# Patient Record
Sex: MALE | Race: WHITE | NOT HISPANIC OR LATINO | Employment: FULL TIME | URBAN - METROPOLITAN AREA
[De-identification: names, ages, dates, MRNs, and addresses within clinical notes are randomized per-mention and may not be internally consistent; named-entity substitution may affect disease eponyms.]

---

## 2019-11-25 ENCOUNTER — APPOINTMENT (OUTPATIENT)
Dept: RADIOLOGY | Facility: MEDICAL CENTER | Age: 49
End: 2019-11-25
Attending: UROLOGY
Payer: COMMERCIAL

## 2019-11-25 ENCOUNTER — ANESTHESIA EVENT (OUTPATIENT)
Dept: SURGERY | Facility: MEDICAL CENTER | Age: 49
End: 2019-11-25
Payer: COMMERCIAL

## 2019-11-25 ENCOUNTER — APPOINTMENT (OUTPATIENT)
Dept: RADIOLOGY | Facility: MEDICAL CENTER | Age: 49
End: 2019-11-25
Attending: EMERGENCY MEDICINE
Payer: COMMERCIAL

## 2019-11-25 ENCOUNTER — HOSPITAL ENCOUNTER (OUTPATIENT)
Facility: MEDICAL CENTER | Age: 49
End: 2019-11-25
Attending: EMERGENCY MEDICINE | Admitting: UROLOGY
Payer: COMMERCIAL

## 2019-11-25 ENCOUNTER — ANESTHESIA (OUTPATIENT)
Dept: SURGERY | Facility: MEDICAL CENTER | Age: 49
End: 2019-11-25
Payer: COMMERCIAL

## 2019-11-25 VITALS
OXYGEN SATURATION: 98 % | WEIGHT: 315 LBS | TEMPERATURE: 98 F | RESPIRATION RATE: 18 BRPM | HEIGHT: 73 IN | DIASTOLIC BLOOD PRESSURE: 79 MMHG | SYSTOLIC BLOOD PRESSURE: 135 MMHG | BODY MASS INDEX: 41.75 KG/M2 | HEART RATE: 76 BPM

## 2019-11-25 DIAGNOSIS — R10.31 RIGHT LOWER QUADRANT ABDOMINAL PAIN: ICD-10-CM

## 2019-11-25 DIAGNOSIS — N20.1 URETEROLITHIASIS: ICD-10-CM

## 2019-11-25 DIAGNOSIS — N50.819 TESTICLE PAIN: ICD-10-CM

## 2019-11-25 LAB
ANION GAP SERPL CALC-SCNC: 15 MMOL/L (ref 0–11.9)
APPEARANCE UR: CLEAR
APTT PPP: 30.3 SEC (ref 24.7–36)
BASOPHILS # BLD AUTO: 0.7 % (ref 0–1.8)
BASOPHILS # BLD: 0.06 K/UL (ref 0–0.12)
BILIRUB UR QL STRIP.AUTO: NEGATIVE
BUN SERPL-MCNC: 15 MG/DL (ref 8–22)
CALCIUM SERPL-MCNC: 9.6 MG/DL (ref 8.4–10.2)
CHLORIDE SERPL-SCNC: 104 MMOL/L (ref 96–112)
CO2 SERPL-SCNC: 20 MMOL/L (ref 20–33)
COLOR UR: YELLOW
CREAT SERPL-MCNC: 0.8 MG/DL (ref 0.5–1.4)
EOSINOPHIL # BLD AUTO: 0.09 K/UL (ref 0–0.51)
EOSINOPHIL NFR BLD: 1.1 % (ref 0–6.9)
ERYTHROCYTE [DISTWIDTH] IN BLOOD BY AUTOMATED COUNT: 42.5 FL (ref 35.9–50)
GLUCOSE SERPL-MCNC: 116 MG/DL (ref 65–99)
GLUCOSE UR STRIP.AUTO-MCNC: NEGATIVE MG/DL
HCT VFR BLD AUTO: 48.2 % (ref 42–52)
HGB BLD-MCNC: 16.4 G/DL (ref 14–18)
IMM GRANULOCYTES # BLD AUTO: 0.03 K/UL (ref 0–0.11)
IMM GRANULOCYTES NFR BLD AUTO: 0.4 % (ref 0–0.9)
INR PPP: 0.93 (ref 0.87–1.13)
KETONES UR STRIP.AUTO-MCNC: ABNORMAL MG/DL
LEUKOCYTE ESTERASE UR QL STRIP.AUTO: NEGATIVE
LYMPHOCYTES # BLD AUTO: 1.57 K/UL (ref 1–4.8)
LYMPHOCYTES NFR BLD: 18.4 % (ref 22–41)
MCH RBC QN AUTO: 30.4 PG (ref 27–33)
MCHC RBC AUTO-ENTMCNC: 34 G/DL (ref 33.7–35.3)
MCV RBC AUTO: 89.4 FL (ref 81.4–97.8)
MICRO URNS: ABNORMAL
MONOCYTES # BLD AUTO: 0.55 K/UL (ref 0–0.85)
MONOCYTES NFR BLD AUTO: 6.4 % (ref 0–13.4)
MUCOUS THREADS #/AREA URNS HPF: ABNORMAL /HPF
NEUTROPHILS # BLD AUTO: 6.24 K/UL (ref 1.82–7.42)
NEUTROPHILS NFR BLD: 73 % (ref 44–72)
NITRITE UR QL STRIP.AUTO: NEGATIVE
NRBC # BLD AUTO: 0 K/UL
NRBC BLD-RTO: 0 /100 WBC
PH UR STRIP.AUTO: 5.5 [PH] (ref 5–8)
PLATELET # BLD AUTO: 303 K/UL (ref 164–446)
PMV BLD AUTO: 10.1 FL (ref 9–12.9)
POTASSIUM SERPL-SCNC: 4.5 MMOL/L (ref 3.6–5.5)
PROT UR QL STRIP: 30 MG/DL
PROTHROMBIN TIME: 12.5 SEC (ref 12–14.6)
RBC # BLD AUTO: 5.39 M/UL (ref 4.7–6.1)
RBC # URNS HPF: >150 /HPF
RBC UR QL AUTO: ABNORMAL
SODIUM SERPL-SCNC: 139 MMOL/L (ref 135–145)
SP GR UR REFRACTOMETRY: 1.03
TRANS CELLS URNS QL MICRO: ABNORMAL /HPF
UNIDENT CRYS URNS QL MICRO: ABNORMAL /HPF
WBC # BLD AUTO: 8.5 K/UL (ref 4.8–10.8)
WBC #/AREA URNS HPF: ABNORMAL /HPF

## 2019-11-25 PROCEDURE — 160028 HCHG SURGERY MINUTES - 1ST 30 MINS LEVEL 3: Performed by: UROLOGY

## 2019-11-25 PROCEDURE — 700105 HCHG RX REV CODE 258: Performed by: UROLOGY

## 2019-11-25 PROCEDURE — 700102 HCHG RX REV CODE 250 W/ 637 OVERRIDE(OP): Performed by: ANESTHESIOLOGY

## 2019-11-25 PROCEDURE — 80048 BASIC METABOLIC PNL TOTAL CA: CPT

## 2019-11-25 PROCEDURE — 160039 HCHG SURGERY MINUTES - EA ADDL 1 MIN LEVEL 3: Performed by: UROLOGY

## 2019-11-25 PROCEDURE — 36415 COLL VENOUS BLD VENIPUNCTURE: CPT

## 2019-11-25 PROCEDURE — C2617 STENT, NON-COR, TEM W/O DEL: HCPCS | Performed by: UROLOGY

## 2019-11-25 PROCEDURE — C1758 CATHETER, URETERAL: HCPCS | Performed by: UROLOGY

## 2019-11-25 PROCEDURE — 160035 HCHG PACU - 1ST 60 MINS PHASE I: Performed by: UROLOGY

## 2019-11-25 PROCEDURE — G0378 HOSPITAL OBSERVATION PER HR: HCPCS

## 2019-11-25 PROCEDURE — 85730 THROMBOPLASTIN TIME PARTIAL: CPT

## 2019-11-25 PROCEDURE — 96374 THER/PROPH/DIAG INJ IV PUSH: CPT

## 2019-11-25 PROCEDURE — 85610 PROTHROMBIN TIME: CPT

## 2019-11-25 PROCEDURE — 700111 HCHG RX REV CODE 636 W/ 250 OVERRIDE (IP): Performed by: ANESTHESIOLOGY

## 2019-11-25 PROCEDURE — 99285 EMERGENCY DEPT VISIT HI MDM: CPT

## 2019-11-25 PROCEDURE — 81001 URINALYSIS AUTO W/SCOPE: CPT

## 2019-11-25 PROCEDURE — 700102 HCHG RX REV CODE 250 W/ 637 OVERRIDE(OP)

## 2019-11-25 PROCEDURE — 74176 CT ABD & PELVIS W/O CONTRAST: CPT

## 2019-11-25 PROCEDURE — 700101 HCHG RX REV CODE 250: Performed by: ANESTHESIOLOGY

## 2019-11-25 PROCEDURE — 160002 HCHG RECOVERY MINUTES (STAT): Performed by: UROLOGY

## 2019-11-25 PROCEDURE — A9270 NON-COVERED ITEM OR SERVICE: HCPCS

## 2019-11-25 PROCEDURE — A9270 NON-COVERED ITEM OR SERVICE: HCPCS | Performed by: ANESTHESIOLOGY

## 2019-11-25 PROCEDURE — 700111 HCHG RX REV CODE 636 W/ 250 OVERRIDE (IP): Performed by: EMERGENCY MEDICINE

## 2019-11-25 PROCEDURE — 160009 HCHG ANES TIME/MIN: Performed by: UROLOGY

## 2019-11-25 PROCEDURE — 85025 COMPLETE CBC W/AUTO DIFF WBC: CPT

## 2019-11-25 PROCEDURE — 700105 HCHG RX REV CODE 258: Performed by: EMERGENCY MEDICINE

## 2019-11-25 PROCEDURE — 500880 HCHG PACK, CYSTO W/SEP LEGGINGS: Performed by: UROLOGY

## 2019-11-25 PROCEDURE — 96375 TX/PRO/DX INJ NEW DRUG ADDON: CPT

## 2019-11-25 PROCEDURE — 501329 HCHG SET, CYSTO IRRIG Y TUR: Performed by: UROLOGY

## 2019-11-25 PROCEDURE — 160048 HCHG OR STATISTICAL LEVEL 1-5: Performed by: UROLOGY

## 2019-11-25 DEVICE — STENT UROLOGICAL POLARIS 6X28  ULTRA: Type: IMPLANTABLE DEVICE | Status: FUNCTIONAL

## 2019-11-25 RX ORDER — HYDROMORPHONE HYDROCHLORIDE 1 MG/ML
1 INJECTION, SOLUTION INTRAMUSCULAR; INTRAVENOUS; SUBCUTANEOUS ONCE
Status: COMPLETED | OUTPATIENT
Start: 2019-11-25 | End: 2019-11-25

## 2019-11-25 RX ORDER — ONDANSETRON 2 MG/ML
4 INJECTION INTRAMUSCULAR; INTRAVENOUS
Status: DISCONTINUED | OUTPATIENT
Start: 2019-11-25 | End: 2019-11-26 | Stop reason: HOSPADM

## 2019-11-25 RX ORDER — SODIUM CHLORIDE, SODIUM LACTATE, POTASSIUM CHLORIDE, CALCIUM CHLORIDE 600; 310; 30; 20 MG/100ML; MG/100ML; MG/100ML; MG/100ML
500 INJECTION, SOLUTION INTRAVENOUS ONCE
Status: DISCONTINUED | OUTPATIENT
Start: 2019-11-25 | End: 2019-11-26 | Stop reason: HOSPADM

## 2019-11-25 RX ORDER — HALOPERIDOL 5 MG/ML
1 INJECTION INTRAMUSCULAR
Status: DISCONTINUED | OUTPATIENT
Start: 2019-11-25 | End: 2019-11-26 | Stop reason: HOSPADM

## 2019-11-25 RX ORDER — ROCURONIUM BROMIDE 10 MG/ML
INJECTION, SOLUTION INTRAVENOUS PRN
Status: DISCONTINUED | OUTPATIENT
Start: 2019-11-25 | End: 2019-11-25 | Stop reason: SURG

## 2019-11-25 RX ORDER — OXYCODONE HCL 5 MG/5 ML
10 SOLUTION, ORAL ORAL
Status: COMPLETED | OUTPATIENT
Start: 2019-11-25 | End: 2019-11-25

## 2019-11-25 RX ORDER — ONDANSETRON 2 MG/ML
INJECTION INTRAMUSCULAR; INTRAVENOUS PRN
Status: DISCONTINUED | OUTPATIENT
Start: 2019-11-25 | End: 2019-11-25 | Stop reason: SURG

## 2019-11-25 RX ORDER — LABETALOL HYDROCHLORIDE 5 MG/ML
5 INJECTION, SOLUTION INTRAVENOUS
Status: DISCONTINUED | OUTPATIENT
Start: 2019-11-25 | End: 2019-11-26 | Stop reason: HOSPADM

## 2019-11-25 RX ORDER — SUCCINYLCHOLINE CHLORIDE 20 MG/ML
INJECTION INTRAMUSCULAR; INTRAVENOUS PRN
Status: DISCONTINUED | OUTPATIENT
Start: 2019-11-25 | End: 2019-11-25 | Stop reason: SURG

## 2019-11-25 RX ORDER — SODIUM CHLORIDE 9 MG/ML
1000 INJECTION, SOLUTION INTRAVENOUS ONCE
Status: COMPLETED | OUTPATIENT
Start: 2019-11-25 | End: 2019-11-25

## 2019-11-25 RX ORDER — PHENAZOPYRIDINE HYDROCHLORIDE 200 MG/1
200 TABLET, FILM COATED ORAL
Status: DISCONTINUED | OUTPATIENT
Start: 2019-11-25 | End: 2019-11-26 | Stop reason: HOSPADM

## 2019-11-25 RX ORDER — CEFAZOLIN SODIUM 1 G/3ML
INJECTION, POWDER, FOR SOLUTION INTRAMUSCULAR; INTRAVENOUS PRN
Status: DISCONTINUED | OUTPATIENT
Start: 2019-11-25 | End: 2019-11-25 | Stop reason: SURG

## 2019-11-25 RX ORDER — DIPHENHYDRAMINE HYDROCHLORIDE 50 MG/ML
12.5 INJECTION INTRAMUSCULAR; INTRAVENOUS
Status: DISCONTINUED | OUTPATIENT
Start: 2019-11-25 | End: 2019-11-26 | Stop reason: HOSPADM

## 2019-11-25 RX ORDER — SODIUM CHLORIDE, SODIUM LACTATE, POTASSIUM CHLORIDE, CALCIUM CHLORIDE 600; 310; 30; 20 MG/100ML; MG/100ML; MG/100ML; MG/100ML
1000 INJECTION, SOLUTION INTRAVENOUS
Status: DISCONTINUED | OUTPATIENT
Start: 2019-11-25 | End: 2019-11-25 | Stop reason: HOSPADM

## 2019-11-25 RX ORDER — PHENAZOPYRIDINE HYDROCHLORIDE 200 MG/1
200 TABLET, FILM COATED ORAL ONCE
Status: CANCELLED | OUTPATIENT
Start: 2019-11-25 | End: 2019-11-26

## 2019-11-25 RX ORDER — MEPERIDINE HYDROCHLORIDE 25 MG/ML
INJECTION INTRAMUSCULAR; INTRAVENOUS; SUBCUTANEOUS PRN
Status: DISCONTINUED | OUTPATIENT
Start: 2019-11-25 | End: 2019-11-25 | Stop reason: SURG

## 2019-11-25 RX ORDER — KETOROLAC TROMETHAMINE 30 MG/ML
30 INJECTION, SOLUTION INTRAMUSCULAR; INTRAVENOUS ONCE
Status: COMPLETED | OUTPATIENT
Start: 2019-11-25 | End: 2019-11-25

## 2019-11-25 RX ORDER — HYDROMORPHONE HYDROCHLORIDE 1 MG/ML
1 INJECTION, SOLUTION INTRAMUSCULAR; INTRAVENOUS; SUBCUTANEOUS
Status: DISCONTINUED | OUTPATIENT
Start: 2019-11-25 | End: 2019-11-26 | Stop reason: HOSPADM

## 2019-11-25 RX ORDER — SODIUM CHLORIDE 9 MG/ML
INJECTION, SOLUTION INTRAVENOUS CONTINUOUS
Status: DISCONTINUED | OUTPATIENT
Start: 2019-11-25 | End: 2019-11-26 | Stop reason: HOSPADM

## 2019-11-25 RX ORDER — KETOROLAC TROMETHAMINE 30 MG/ML
INJECTION, SOLUTION INTRAMUSCULAR; INTRAVENOUS PRN
Status: DISCONTINUED | OUTPATIENT
Start: 2019-11-25 | End: 2019-11-25 | Stop reason: SURG

## 2019-11-25 RX ORDER — LIDOCAINE HYDROCHLORIDE 20 MG/ML
INJECTION, SOLUTION EPIDURAL; INFILTRATION; INTRACAUDAL; PERINEURAL PRN
Status: DISCONTINUED | OUTPATIENT
Start: 2019-11-25 | End: 2019-11-25 | Stop reason: SURG

## 2019-11-25 RX ORDER — MIDAZOLAM HYDROCHLORIDE 1 MG/ML
2 INJECTION INTRAMUSCULAR; INTRAVENOUS
Status: DISCONTINUED | OUTPATIENT
Start: 2019-11-25 | End: 2019-11-26 | Stop reason: HOSPADM

## 2019-11-25 RX ORDER — PHENAZOPYRIDINE HYDROCHLORIDE 200 MG/1
TABLET, FILM COATED ORAL
Status: COMPLETED
Start: 2019-11-25 | End: 2019-11-25

## 2019-11-25 RX ORDER — OXYCODONE HCL 5 MG/5 ML
5 SOLUTION, ORAL ORAL
Status: COMPLETED | OUTPATIENT
Start: 2019-11-25 | End: 2019-11-25

## 2019-11-25 RX ORDER — MEPERIDINE HYDROCHLORIDE 25 MG/ML
25 INJECTION INTRAMUSCULAR; INTRAVENOUS; SUBCUTANEOUS
Status: DISCONTINUED | OUTPATIENT
Start: 2019-11-25 | End: 2019-11-26 | Stop reason: HOSPADM

## 2019-11-25 RX ADMIN — KETOROLAC TROMETHAMINE 30 MG: 30 INJECTION, SOLUTION INTRAMUSCULAR at 19:58

## 2019-11-25 RX ADMIN — SUCCINYLCHOLINE CHLORIDE 200 MG: 20 INJECTION, SOLUTION INTRAMUSCULAR; INTRAVENOUS at 19:40

## 2019-11-25 RX ADMIN — SODIUM CHLORIDE 1000 ML: 9 INJECTION, SOLUTION INTRAVENOUS at 08:36

## 2019-11-25 RX ADMIN — SODIUM CHLORIDE: 9 INJECTION, SOLUTION INTRAVENOUS at 13:34

## 2019-11-25 RX ADMIN — PROPOFOL 100 MG: 10 INJECTION, EMULSION INTRAVENOUS at 19:40

## 2019-11-25 RX ADMIN — HYDROMORPHONE HYDROCHLORIDE 1 MG: 1 INJECTION, SOLUTION INTRAMUSCULAR; INTRAVENOUS; SUBCUTANEOUS at 08:36

## 2019-11-25 RX ADMIN — PHENAZOPYRIDINE 200 MG: 200 TABLET ORAL at 20:36

## 2019-11-25 RX ADMIN — KETOROLAC TROMETHAMINE 30 MG: 30 INJECTION, SOLUTION INTRAMUSCULAR at 08:37

## 2019-11-25 RX ADMIN — CEFAZOLIN 3 G: 1 INJECTION, POWDER, FOR SOLUTION INTRAVENOUS at 19:35

## 2019-11-25 RX ADMIN — PROPOFOL 30 MG: 10 INJECTION, EMULSION INTRAVENOUS at 19:38

## 2019-11-25 RX ADMIN — ONDANSETRON 4 MG: 2 INJECTION INTRAMUSCULAR; INTRAVENOUS at 20:13

## 2019-11-25 RX ADMIN — ALFENTANIL HYDROCHLORIDE 1000 MCG: 500 INJECTION, SOLUTION INTRAVENOUS at 19:38

## 2019-11-25 RX ADMIN — SODIUM CHLORIDE, POTASSIUM CHLORIDE, SODIUM LACTATE AND CALCIUM CHLORIDE: 600; 310; 30; 20 INJECTION, SOLUTION INTRAVENOUS at 19:35

## 2019-11-25 RX ADMIN — LIDOCAINE HYDROCHLORIDE 40 MG: 20 INJECTION, SOLUTION EPIDURAL; INFILTRATION; INTRACAUDAL; PERINEURAL at 19:38

## 2019-11-25 RX ADMIN — SUGAMMADEX 200 MG: 100 INJECTION, SOLUTION INTRAVENOUS at 20:17

## 2019-11-25 RX ADMIN — EPHEDRINE SULFATE 15 MG: 50 INJECTION INTRAMUSCULAR; INTRAVENOUS; SUBCUTANEOUS at 19:55

## 2019-11-25 RX ADMIN — OXYCODONE HYDROCHLORIDE 5 MG: 5 SOLUTION ORAL at 23:22

## 2019-11-25 RX ADMIN — ROCURONIUM BROMIDE 30 MG: 10 INJECTION, SOLUTION INTRAVENOUS at 19:43

## 2019-11-25 RX ADMIN — MEPERIDINE HYDROCHLORIDE 25 MG: 25 INJECTION INTRAMUSCULAR; INTRAVENOUS; SUBCUTANEOUS at 19:48

## 2019-11-25 RX ADMIN — ROCURONIUM BROMIDE 10 MG: 10 INJECTION, SOLUTION INTRAVENOUS at 19:52

## 2019-11-25 RX ADMIN — ROCURONIUM BROMIDE 5 MG: 10 INJECTION, SOLUTION INTRAVENOUS at 19:38

## 2019-11-25 RX ADMIN — MEPERIDINE HYDROCHLORIDE 25 MG: 25 INJECTION INTRAMUSCULAR; INTRAVENOUS; SUBCUTANEOUS at 20:12

## 2019-11-25 ASSESSMENT — PATIENT HEALTH QUESTIONNAIRE - PHQ9
2. FEELING DOWN, DEPRESSED, IRRITABLE, OR HOPELESS: NOT AT ALL
1. LITTLE INTEREST OR PLEASURE IN DOING THINGS: NOT AT ALL
SUM OF ALL RESPONSES TO PHQ9 QUESTIONS 1 AND 2: 0

## 2019-11-25 ASSESSMENT — LIFESTYLE VARIABLES
ALCOHOL_USE: YES
TOTAL SCORE: 0
TOTAL SCORE: 0
AVERAGE NUMBER OF DAYS PER WEEK YOU HAVE A DRINK CONTAINING ALCOHOL: 2
CONSUMPTION TOTAL: NEGATIVE
EVER_SMOKED: NEVER
HAVE YOU EVER FELT YOU SHOULD CUT DOWN ON YOUR DRINKING: NO
ON A TYPICAL DAY WHEN YOU DRINK ALCOHOL HOW MANY DRINKS DO YOU HAVE: 2
HOW MANY TIMES IN THE PAST YEAR HAVE YOU HAD 5 OR MORE DRINKS IN A DAY: 0
EVER HAD A DRINK FIRST THING IN THE MORNING TO STEADY YOUR NERVES TO GET RID OF A HANGOVER: NO
TOTAL SCORE: 0
HAVE PEOPLE ANNOYED YOU BY CRITICIZING YOUR DRINKING: NO
DOES PATIENT WANT TO STOP DRINKING: NO
EVER FELT BAD OR GUILTY ABOUT YOUR DRINKING: NO

## 2019-11-25 ASSESSMENT — COGNITIVE AND FUNCTIONAL STATUS - GENERAL
MOBILITY SCORE: 24
DAILY ACTIVITIY SCORE: 24
SUGGESTED CMS G CODE MODIFIER MOBILITY: CH
SUGGESTED CMS G CODE MODIFIER DAILY ACTIVITY: CH

## 2019-11-25 ASSESSMENT — COPD QUESTIONNAIRES
COPD SCREENING SCORE: 0
DO YOU EVER COUGH UP ANY MUCUS OR PHLEGM?: NO/ONLY WITH OCCASIONAL COLDS OR INFECTIONS
DURING THE PAST 4 WEEKS HOW MUCH DID YOU FEEL SHORT OF BREATH: NONE/LITTLE OF THE TIME
IN THE PAST 12 MONTHS DO YOU DO LESS THAN YOU USED TO BECAUSE OF YOUR BREATHING PROBLEMS: DISAGREE/UNSURE
HAVE YOU SMOKED AT LEAST 100 CIGARETTES IN YOUR ENTIRE LIFE: NO/DON'T KNOW

## 2019-11-25 ASSESSMENT — PAIN SCALES - GENERAL: PAIN_LEVEL: 1

## 2019-11-25 NOTE — ED PROVIDER NOTES
"ED Provider Note    CHIEF COMPLAINT  Chief Complaint   Patient presents with   • LLQ Pain     \"feels like I've been kicked in the left testicle.\" started 0700 yesterday AM   • Groin Pain       HPI  Jae Brandon is a 49 y.o. male who presents to the emergency department the chief complaint of abdominal pain and testicular pain.  Patient describes acute onset of left lower quadrant abdominal pain radiating to the left testicle.  He says his left testicle hurts like somebody has kicked him in the groin.  He describes a sharp stabbing pain.  Relatively severe.  No alleviating or exacerbating factors.  No fevers, no dysuria.  No hematuria.  No vomiting.    REVIEW OF SYSTEMS  See HPI for further details. All other systems are negative.     PAST MEDICAL HISTORY  History reviewed. No pertinent past medical history.    FAMILY HISTORY  History reviewed. No pertinent family history.    SOCIAL HISTORY  Social History     Socioeconomic History   • Marital status:      Spouse name: Not on file   • Number of children: Not on file   • Years of education: Not on file   • Highest education level: Not on file   Occupational History   • Not on file   Social Needs   • Financial resource strain: Not on file   • Food insecurity:     Worry: Not on file     Inability: Not on file   • Transportation needs:     Medical: Not on file     Non-medical: Not on file   Tobacco Use   • Smoking status: Never Smoker   • Smokeless tobacco: Never Used   Substance and Sexual Activity   • Alcohol use: Not Currently   • Drug use: Not Currently   • Sexual activity: Not on file   Lifestyle   • Physical activity:     Days per week: Not on file     Minutes per session: Not on file   • Stress: Not on file   Relationships   • Social connections:     Talks on phone: Not on file     Gets together: Not on file     Attends Zoroastrianism service: Not on file     Active member of club or organization: Not on file     Attends meetings of clubs or " "organizations: Not on file     Relationship status: Not on file   • Intimate partner violence:     Fear of current or ex partner: Not on file     Emotionally abused: Not on file     Physically abused: Not on file     Forced sexual activity: Not on file   Other Topics Concern   • Not on file   Social History Narrative   • Not on file       SURGICAL HISTORY  History reviewed. No pertinent surgical history.    CURRENT MEDICATIONS  Home Medications    **Home medications have not yet been reviewed for this encounter**         ALLERGIES  No Known Allergies    PHYSICAL EXAM  VITAL SIGNS: /86   Pulse (!) 58   Temp 36.1 °C (97 °F) (Temporal)   Resp 16   Ht 1.854 m (6' 1\")   Wt (!) 143 kg (315 lb 4.1 oz)   SpO2 95%   BMI 41.59 kg/m²   Constitutional: Well developed, Well nourished, mild distress, Non-toxic appearance.  Uncomfortable but stoic male.  HENT: Normocephalic, Atraumatic, Bilateral external ears normal, Oropharynx moist, No oral exudates, Nose normal.   Eyes: PERRL, EOMI, Conjunctiva normal, No discharge.   Neck: Normal range of motion, No tenderness, Supple, No stridor.   Lymphatic: No lymphadenopathy noted.   Cardiovascular: Normal heart rate, Normal rhythm, No murmurs, No rubs, No gallops.   Thorax & Lungs: Normal breath sounds, No respiratory distress, No wheezing, No chest tenderness.   Abdomen: Mild tenderness in the very low left lower quadrant, obese, no rebound tenderness or guarding.  No peritoneal signs.  Skin: Warm, Dry, No erythema, No rash.   Back: No tenderness, No CVA tenderness.   Extremities: Intact distal pulses, No edema, No tenderness, No cyanosis, No clubbing.   Neurologic: Alert & oriented x 3, Normal motor function, Normal sensory function, No focal deficits noted.       RADIOLOGY/PROCEDURES  CT-RENAL COLIC EVALUATION(A/P W/O)   Final Result      10 mm left UPJ calculus causes mild hydroureteronephrosis      Severe colonic diverticulosis without evidence of diverticulitis    "     Results for orders placed or performed during the hospital encounter of 11/25/19   URINALYSIS,CULTURE IF INDICATED   Result Value Ref Range    Color Yellow     Character Clear     Ph 5.5 5.0 - 8.0    Glucose Negative Negative mg/dL    Ketones Trace (A) Negative mg/dL    Protein 30 (A) Negative mg/dL    Bilirubin Negative Negative    Nitrite Negative Negative    Leukocyte Esterase Negative Negative    Occult Blood Large (A) Negative    Micro Urine Req Microscopic    REFRACTOMETER SG   Result Value Ref Range    Specific Gravity 1.028    URINE MICROSCOPIC (W/UA)   Result Value Ref Range    WBC 2-5 (A) /hpf    RBC >150 (A) /hpf    Trans Epithelial Cells Moderate (A) /hpf    Mucous Threads Many /hpf    Urine Crystals Mod Ca Oxalate /hpf   CBC WITH DIFFERENTIAL   Result Value Ref Range    WBC 8.5 4.8 - 10.8 K/uL    RBC 5.39 4.70 - 6.10 M/uL    Hemoglobin 16.4 14.0 - 18.0 g/dL    Hematocrit 48.2 42.0 - 52.0 %    MCV 89.4 81.4 - 97.8 fL    MCH 30.4 27.0 - 33.0 pg    MCHC 34.0 33.7 - 35.3 g/dL    RDW 42.5 35.9 - 50.0 fL    Platelet Count 303 164 - 446 K/uL    MPV 10.1 9.0 - 12.9 fL    Neutrophils-Polys 73.00 (H) 44.00 - 72.00 %    Lymphocytes 18.40 (L) 22.00 - 41.00 %    Monocytes 6.40 0.00 - 13.40 %    Eosinophils 1.10 0.00 - 6.90 %    Basophils 0.70 0.00 - 1.80 %    Immature Granulocytes 0.40 0.00 - 0.90 %    Nucleated RBC 0.00 /100 WBC    Neutrophils (Absolute) 6.24 1.82 - 7.42 K/uL    Lymphs (Absolute) 1.57 1.00 - 4.80 K/uL    Monos (Absolute) 0.55 0.00 - 0.85 K/uL    Eos (Absolute) 0.09 0.00 - 0.51 K/uL    Baso (Absolute) 0.06 0.00 - 0.12 K/uL    Immature Granulocytes (abs) 0.03 0.00 - 0.11 K/uL    NRBC (Absolute) 0.00 K/uL   BASIC METABOLIC PANEL   Result Value Ref Range    Sodium 139 135 - 145 mmol/L    Potassium 4.5 3.6 - 5.5 mmol/L    Chloride 104 96 - 112 mmol/L    Co2 20 20 - 33 mmol/L    Glucose 116 (H) 65 - 99 mg/dL    Bun 15 8 - 22 mg/dL    Creatinine 0.80 0.50 - 1.40 mg/dL    Calcium 9.6 8.4 - 10.2 mg/dL     Anion Gap 15.0 (H) 0.0 - 11.9   PROTHROMBIN TIME (INR)   Result Value Ref Range    PT 12.5 12.0 - 14.6 sec    INR 0.93 0.87 - 1.13   APTT   Result Value Ref Range    APTT 30.3 24.7 - 36.0 sec   ESTIMATED GFR   Result Value Ref Range    GFR If African American >60 >60 mL/min/1.73 m 2    GFR If Non African American >60 >60 mL/min/1.73 m 2         COURSE & MEDICAL DECISION MAKING  Pertinent Labs & Imaging studies reviewed. (See chart for details)    The patient presents today with left lower quadrant abdominal pain and testicular pain.  I am suspicious for ureterolithiasis as etiology the patient's pain.  Will obtain urinalysis and CT scan.    Urinalysis is remarkable for hematuria.  Continue to await CT scan.  Patient's pain is been well controlled with hydromorphone and Toradol.    CT scan is obtained.  The patient has a 10 mm proximal UPJ stone on the left.  Obstructive changes noted.  Spoke with the on-call urologist Dr. Whitehead who plans to take the patient to the OR.    The urologist asked me to write holding orders.  This is been completed as discussed.  Patient will be admitted for operative intervention for his large ureteral stone.      FINAL IMPRESSION  1. Right lower quadrant abdominal pain    2. Testicle pain    3. Ureterolithiasis            Electronically signed by: Rashi Summers, 11/25/2019 1:16 PM

## 2019-11-25 NOTE — PROGRESS NOTES
Pt arrived to floor from ER. Assumed care of patient. Discussed daily plan of care, oriented patient to floor. VSS. Pt currently reports tolerable pain, rated at 5/10. Denies nausea at this time. Hourly rounding in place.

## 2019-11-25 NOTE — PROGRESS NOTES
[facility] Dr. Whitehead paged per nursing communication.    1345 Call returned [facility]. No new orders received at this time.

## 2019-11-25 NOTE — ED TRIAGE NOTES
"Chief Complaint   Patient presents with   • LLQ Pain     \"feels like I've been kicked in the left testicle.\" started 0700 yesterday AM   • Groin Injury     Pt reports that he was sent from  for further eval.  /90   Pulse 88   Temp 36.1 °C (97 °F) (Temporal)   Resp 16   Ht 1.854 m (6' 1\")   Wt (!) 143 kg (315 lb 4.1 oz)   SpO2 95%   Pt informed of wait times. Educated on triage process.  Asked to return to triage RN for any new or worsening of symptoms. Thanked for patience.        "

## 2019-11-25 NOTE — ED NOTES
Updated pt on POC. In agreement with POC. Pt with no needs at this time. Call light in reach. WCTM.

## 2019-11-25 NOTE — ED NOTES
Report called to DONTE Sousa.  Aware pt is on the way up via  and that I am available for any questions/concerns on pt's arrival to floor.  No changes on departing ED.  Pt has remained NPO while in ED.

## 2019-11-26 NOTE — CARE PLAN
Problem: Communication  Goal: The ability to communicate needs accurately and effectively will improve  Outcome: PROGRESSING AS EXPECTED  Intervention: [place] patient and significant other/support system to call light to alert staff of needs  Flowsheets (Taken 11/25/2019 1612)  Oriented to:: All of the Following : Location of Bathroom, Visiting Policy, Unit Routine, Call Light and Bedside Controls, Bedside Rail Policy, Smoking Policy, Rights and Responsibilities, Bedside Report, and Patient Education Notebook  Note:   Pt oriented to unit upon arrival from ER. Pt instructed to utilize call light to call for assistance as needed. Pt verbalized understanding. Hourly rounding in place to ensure needs are met.       Problem: Safety  Goal: Will remain free from falls  Outcome: PROGRESSING AS EXPECTED  Intervention: Implement fall precautions  Flowsheets (Taken 11/25/2019 1330)  Environmental Precautions: Personal Belongings, Wastebasket, Call Bell etc. in Easy Reach;Bed in Low Position;Communication Sign for Patients & Families;Mobility Assessed & Appropriate Sign Placed  Note:   Environmental fall precautions and hourly rounding in place. Pt instructed to call for assistance as needed. Pt verbalized understanding, able to ambulate independently with steady gait, no assistive device needed.

## 2019-11-26 NOTE — ANESTHESIA POSTPROCEDURE EVALUATION
Patient: Jae Brandon    Procedure Summary     Date:  11/25/19 Room / Location:   OR  / SURGERY [facility]    Anesthesia Start:  1935 Anesthesia Stop:  2027    Procedures:       CYSTOSCOPY (Left )      URETEROSCOPY      LITHOTRIPSY, USING LASER      STENT PLACEMENT Diagnosis:      Surgeon:  Donovan Whitehead M.D. Responsible Provider:  Mitchel Goodwin M.D.    Anesthesia Type:  general ASA Status:  3 - Emergent          Final Anesthesia Type: general  Last vitals  BP   Blood Pressure: 137/79    Temp   36.9 °C (98.5 °F)    Pulse   Pulse: 65   Resp   18    SpO2   94 %      Anesthesia Post Evaluation    Patient location during evaluation: PACU  Patient participation: complete - patient participated  Level of consciousness: awake and alert  Pain score: 1    Airway patency: patent  Anesthetic complications: no  Cardiovascular status: hemodynamically stable  Respiratory status: acceptable  Hydration status: euvolemic    PONV: none           Nurse Pain Score: 5 (NPRS)

## 2019-11-26 NOTE — OR SURGEON
Immediate Post OP Note    PreOp Diagnosis: 11 mm left UPJ calculus; Left renal colic    PostOp Diagnosis: same; tight distal ureter    Procedure(s):  CYSTOSCOPY  Attempted dilation of left ureter  STENT PLACEMENT    Surgeon(s):  Donovan Whitehead M.D.    Anesthesiologist/Type of Anesthesia:  Anesthesiologist: Mitchel Goodwin M.D./* No anesthesia type entered *    Surgical Staff:  Circulator: Dane Treadwell R.N.  Scrub Person: Nikos Burns    Specimens removed if any:  * No specimens in log *    Estimated Blood Loss: none    Findings: tight Left ureteral orifice    Complications: none.  Stable to PACU.  Home tonight.        11/25/2019 8:18 PM Donovan Whitehead M.D.

## 2019-11-26 NOTE — DISCHARGE INSTRUCTIONS
Discharge Instructions    Discharged to home by car with relative. Discharged via walking, hospital escort: Yes.  Special equipment needed: Not Applicable    Be sure to schedule a follow-up appointment with your primary care doctor or any specialists as instructed.     Discharge Plan:   Diet Plan: Discussed  Activity Level: Discussed  Confirmed Follow up Appointment: Patient to Call and Schedule Appointment  Medication Reconciliation Updated: Yes  Influenza Vaccine Indication: Not indicated: Previously immunized this influenza season and > 8 years of age    I understand that a diet low in cholesterol, fat, and sodium is recommended for good health. Unless I have been given specific instructions below for another diet, I accept this instruction as my diet prescription.   Other diet: regular    Special Instructions: None    · Is patient discharged on Warfarin / Coumadin?   No     Depression / Suicide Risk    As you are discharged from this [facility] facility, it is important to learn how to keep safe from harming yourself.    Recognize the warning signs:  · Abrupt changes in personality, positive or negative- including increase in energy   · Giving away possessions  · Change in eating patterns- significant weight changes-  positive or negative  · Change in sleeping patterns- unable to sleep or sleeping all the time   · Unwillingness or inability to communicate  · Depression  · Unusual sadness, discouragement and loneliness  · Talk of wanting to die  · Neglect of personal appearance   · Rebelliousness- reckless behavior  · Withdrawal from people/activities they love  · Confusion- inability to concentrate     If you or a loved one observes any of these behaviors or has concerns about self-harm, here's what you can do:  · Talk about it- your feelings and reasons for harming yourself  · Remove any means that you might use to hurt yourself (examples: pills, rope, extension cords, firearm)  · Get professional help from  the community (Mental Health, Substance Abuse, psychological counseling)  · Do not be alone:Call your Safe Contact- someone whom you trust who will be there for you.  · Call your local CRISIS HOTLINE 870-4932 or 524-394-7823  · Call your local Children's Mobile Crisis Response Team [place] (904) 593-2248 or www.[employer]  · Call the toll free National Suicide Prevention Hotlines   · National Suicide Prevention Lifeline 527-161-URSM (2713)  · [place] Line Network 800-SUICIDE (214-5496)        Ureteral Stent Implantation  Introduction  Ureteral stent implantation is a procedure to insert (implant) a flexible, soft, plastic tube (stent) into a tube (ureter) that drains urine from the kidneys. The stent supports the ureter while it heals and helps to drain urine from the kidneys. You may have a ureteral stent implanted after having a procedure to remove a blockage from the ureter (ureterolysis or pyeloplasty). You may also have a stent implanted to open the flow of urine when you have a blockage caused by a kidney stone, tumor, blood clot, or infection.  You have two ureters, one on each side of the body. The ureters connect the kidneys to the organ that holds urine until it passes out of the body (bladder). The stent is placed so that one end is in the kidney, and one end is in the bladder. The stent is usually taken out after your ureter has healed. Depending on your condition, you may have a stent for just a few weeks, or you may have a long-term stent that will need to be replaced every few months.  Tell a health care provider about:  · Any allergies you have.  · All medicines you are taking, including vitamins, herbs, eye drops, creams, and over-the-counter medicines.  · Any problems you or family members have had with anesthetic medicines.  · Any blood disorders you have.  · Any surgeries you have had.  · Any medical conditions you have.  · Whether you are pregnant or may be pregnant.  What are  the risks?  Generally, this is a safe procedure. However, problems may occur, including:  · Infection.  · Bleeding.  · Allergic reactions to medicines.  · Damage to other structures or organs. Tearing (perforation) of the ureter is possible.  · Movement of the stent away from where it is placed during surgery (migration).  What happens before the procedure?  · Ask your health care provider about:  ¨ Changing or stopping your regular medicines. This is especially important if you are taking diabetes medicines or blood thinners.  ¨ Taking medicines such as aspirin and ibuprofen. These medicines can thin your blood. Do not take these medicines before your procedure if your health care provider instructs you not to.  · Follow instructions from your health care provider about eating or drinking restrictions.  · Do not drink alcohol and do not use any tobacco products before your procedure, as told by your health care provider.  · You may be given antibiotic medicine to help prevent infection.  · Plan to have someone take you home after the procedure.  · If you go home right after the procedure, plan to have someone with you for 24 hours.  What happens during the procedure?  · An IV tube will be inserted into one of your veins.  · You will be given a medicine to make you fall asleep (general anesthetic). You may also be given a medicine to help you relax (sedative).  · A thin, tube-shaped instrument with a light and tiny camera at the end (cystoscope) will be inserted into your urethra. The urethra is the tube that drains urine from the bladder out of the body. In men, the urethra opens at the end of the penis. In women, the urethra opens in front of the vaginal opening.  · The cystoscope will be passed into your bladder.  · A thin wire (guide wire) will be passed through your bladder and into your ureter. This is used to guide the stent into your ureter.  · The stent will be inserted into your ureter.  · The guide wire  and the cystoscope will be removed.  · A flexible tube (catheter) will be inserted through your urethra so that one end is in your bladder. This helps to drain urine from your bladder.  The procedure may vary among hospitals and health care providers.  What happens after the procedure?  · Your blood pressure, heart rate, breathing rate, and blood oxygen level will be monitored often until the medicines you were given have worn off.  · You may continue to receive medicine and fluids through an IV tube.  · You may have some soreness or pain in your abdomen and urethra. Medicines will be available to help you.  · You will be encouraged to get up and walk around as soon as you can.  · You will have some blood in your urine.  · Do not drive for 24 hours if you received a sedative.  This information is not intended to replace advice given to you by your health care provider. Make sure you discuss any questions you have with your health care provider.  Document Released: 12/15/2001 Document Revised: 05/25/2017 Document Reviewed: 07/01/2016  © 2017 [employer]    GENERAL POST-OPERATIVE  PATIENT INSTRUCTIONS      FOLLOW-UP:  If you have any fevers greater than 102.5, drainage from you wound that is not clear or looks infected, persistent bleeding, increasing abdominal pain, problems urinating, or persistent nausea/vomiting.      DIET:  You may eat any foods that you can tolerate.  It is a good idea to eat a high fiber diet and take in plenty of fluids to prevent constipation.  If you do become constipated you may want to take a mild laxative or take ducolax tablets on a daily basis until your bowel habits are regular.  Constipation can be very uncomfortable, along with straining, after recent surgery.    ACTIVITY:  You are encouraged to cough and deep breath or use your incentive spirometer if you were given one, every 15-30 minutes when awake.  This will help prevent respiratory complications and low grade fevers  post-operatively if you had a general anesthetic.        QUESTIONS:  Please feel free to call your physician or the hospital  if you have any questions, and they will be glad to assist you.    Cystoscopy, Care After  Refer to this sheet in the next few weeks. These instructions provide you with information on caring for yourself after your procedure. Your caregiver may also give you more specific instructions. Your treatment has been planned according to current medical practices, but problems sometimes occur. Call your caregiver if you have any problems or questions after your procedure.  HOME CARE INSTRUCTIONS   Things you can do to ease any discomfort after your procedure include:  · Drinking enough water and fluids to keep your urine clear or pale yellow.  · Taking a warm bath to relieve any burning feelings.  SEEK IMMEDIATE MEDICAL CARE IF:   · You have an increase in blood in your urine.  · You notice blood clots in your urine.  · You have difficulty passing urine.  · You have the chills.  · You have abdominal pain.  · You have a fever or persistent symptoms for more than 2-3 days.  · You have a fever and your symptoms suddenly get worse.  MAKE SURE YOU:   · Understand these instructions.  · Will watch your condition.  · Will get help right away if you are not doing well or get worse.     This information is not intended to replace advice given to you by your health care provider. Make sure you discuss any questions you have with your health care provider.     Document Released: 07/07/2006 Document Revised: 01/08/2016 Document Reviewed: 06/10/2013  [employer] Interactive Patient Education ©2016 [employer]

## 2019-11-26 NOTE — PROGRESS NOTES
Urology pre op note.    48 y/o male with 24 hours of left flank pain.  Rob ER and Ct shows 11x7 mm left renal calculus. Will take to Or for Left ureteroscopy, lasertripsy of stone and stent insertion.  Risks and benefits discussed.  Patient agrees to procedure.

## 2019-11-26 NOTE — ANESTHESIA PREPROCEDURE EVALUATION
Relevant Problems   No relevant active problems       Physical Exam    Airway   Mallampati: II  TM distance: >3 FB  Neck ROM: full       Cardiovascular - normal exam  Rhythm: regular  Rate: normal     Dental - normal exam         Pulmonary - normal exam  Breath sounds clear to auscultation     Abdominal    Neurological - normal exam                 Anesthesia Plan    ASA 3- EMERGENT   ASA physical status emergent criteria: sepsis    Plan - general       Airway plan will be ETT        Induction: intravenous and rapid sequence    Postoperative Plan: Postoperative administration of opioids is intended.    Pertinent diagnostic labs and testing reviewed    Informed Consent:    Anesthetic plan and risks discussed with patient.    Use of blood products discussed with: patient whom consented to blood products.

## 2019-11-26 NOTE — OR NURSING
2026-arrived pacu.  Awake alert oriented x3. vss spontaneous clr resp throughout.  Pt denies pain or nausea.    2030- pt medicated per md orders.  Urinary meatus dry and clean w/o d/c or redness. Tolerates po flds w/o n/v.  2045-wife updated by phone. Vss. Meets criteria for transfer to floor.  2055-report to Gi KENT.   2105-pt transferred to floor.

## 2019-11-26 NOTE — ANESTHESIA PROCEDURE NOTES
Airway  Date/Time: 11/25/2019 7:41 PM  Performed by: Mitchel Goodwin M.D.  Authorized by: Mitchel Goodwin M.D.     Location:  OR  Urgency:  Elective  Indications for Airway Management:  Anesthesia  Spontaneous Ventilation: absent    Sedation Level:  Deep  Preoxygenated: Yes    Patient Position:  Sniffing  Final Airway Type:  Endotracheal airway  Final Endotracheal Airway:  ETT  Cuffed: Yes    Technique Used for Successful ETT Placement:  Direct laryngoscopy  Devices/Methods Used in Placement:  Intubating stylet and cricoid pressure  Insertion Site:  Oral  Blade Type:  Nixon  Laryngoscope Blade/Videolaryngoscope Blade Size:  2  ETT Size (mm):  8.0  Measured from:  Lips  ETT to Lips (cm):  24  Placement Verified by: auscultation and capnometry    Cormack-Lehane Classification:  Grade I - full view of glottis  Number of Attempts at Approach:  1

## 2019-11-26 NOTE — OP REPORT
DATE OF SERVICE:  11/25/2019    PREOPERATIVE DIAGNOSIS:  10 mm left ureteropelvic junction calculus with left   renal colic.    POSTOPERATIVE DIAGNOSES:   1.  10 mm left ureteropelvic junction calculus with left renal colic.  2.  Tight left ureter.    ANESTHESIA:  General.    ANESTHESIOLOGIST:  Mitchel Goodwin MD    SURGEON:  Donovan Whitehead MD    PROCEDURE:  Cystoscopy with attempted dilation of left ureter and insertion of   left ureteral stent.    BRIEF HISTORY:  This 49-year-old male presented to the emergency room today   with about a 24-hour history of left renal colic.  CT scan showed   approximately 11 x 7 mm left ureteropelvic junction calculus.  Because of the   stone, he now presents for cystoscopy, possible ureteroscopy, laser tripsy and   stent insertion.  Risks and benefits were discussed with the patient in the   preoperative area.    REPORT OF OPERATION:  Under general anesthesia with the patient in the   lithotomy position, the genitalia were prepped and draped in the usual manner.    The 22-Belgian cystoscope was introduced into the bladder.  The urethra and   prostatic urethra were normal.  Bladder was normal.  Next, under fluoroscopic   guidance, I passed a ZIPwire up to the left kidney without difficulty.  Next,   the bladder was drained and the cystoscope removed.  I attempted passing the   inner sheath of a 12x14 Belgian diameter access sheath.  The inner sheath did   not pass up through the ureteral orifice.  At this point, I got the sequential   ureteral dilators.  Starting at 6-Belgian, I was able to go up to 10-Belgian   easily, but then with the 12-Belgian dilator, I could not get it into the   orifice.  At this point, it would be very difficult to get any access sheath   up there and with the large stone, I would need irrigation to be able to   minimize damage to the kidney.  At this point, I elected to place ureteral   stent.  The cystoscope was backloaded onto the wire and passed  back into the   bladder.  I then passed a 6-Guyanese x 28 cm long double pigtail stent over the   wire.  I did leave a short suture on the distal end extending into the   bladder.  At this point, the wire was removed.  There was a good curl of stent   in the kidney and bladder.  At this point, there was really some bloody   drainage from the stent.  The bladder was drained, the cystoscope was removed.    The patient was cleaned up, woken up, and transferred to the PACU in stable   condition.       ____________________________________     MD ASHISH HUGHES / ROCHELLE    DD:  11/25/2019 20:24:23  DT:  11/25/2019 20:57:21    D#:  [id number]  Job#:  [id number]

## 2019-11-26 NOTE — ANESTHESIA PREPROCEDURE EVALUATION
Relevant Problems   No relevant active problems       Physical Exam    Anesthesia Plan    ASA 3- EMERGENT   ASA physical status emergent criteria: sepsis    Plan - general       Airway plan will be ETT        Induction: rapid sequence and intravenous          Informed Consent:    Anesthetic plan and risks discussed with patient.

## 2019-11-26 NOTE — OR NURSING
Late entry; 1930 Patient allergies and NPO status verified, home medication reconciliation completed and belongings secured. Surgical site verified with patient. Patient verbalizes understanding of pain scale, expected course of stay and plan of care; patient and family state verbal understanding at this time. IV access verified. Sequentials in place as ordered. Triple aim not completed due to Dr Goodwin stating not to complete; pt to go to OR now.

## 2019-11-26 NOTE — ANESTHESIA TIME REPORT
Anesthesia Start and Stop Event Times     Date Time Event    11/25/2019 1858 Ready for Procedure     1935 Anesthesia Start     2027 Anesthesia Stop        Responsible Staff  11/25/19    Name Role Begin End    Mitchel Goodwin M.D. Anesth 1935 2027        Preop Diagnosis (Free Text):  Pre-op Diagnosis             Preop Diagnosis (Codes):    Post op Diagnosis  Left ureteral stone      Premium Reason  A. 3PM - 7AM    Comments:

## 2019-11-26 NOTE — CONSULTS
DATE OF SERVICE:  11/25/2019    UROLOGY EMERGENCY ROOM CONSULTATION    REASON FOR CONSULTATION:  Left renal colic with 10 mm left ureteropelvic   junction calculus.    BRIEF HISTORY:  This 49-year-old male presented to the emergency room today   with approximately 24 hours of left flank pain.  CT scan in the emergency room   did show approximately a 11x7 mm left ureteropelvic junction calculus.  No   other stones were visible.  He now is admitted to the hospital for pain   control and definitive treatment of this calculus.    PAST MEDICAL HISTORY:  Noncontributory.    PAST SURGICAL HISTORY:  Noncontributory.    ALLERGIES:  None known.    PHYSICAL EXAMINATION:  VITAL SIGNS:  Shows blood pressure is 150/86 with a pulse of 58.  He is   afebrile.  CONSTITUTIONAL:  This is a well-developed, well-nourished male, in mild   distress.  HEAD, EYES, EARS, NOSE, AND THROAT:  Grossly unremarkable.  Eyes, pupils were   equal and reactive to light and accommodation.  NECK:  Supple without normal range of motion.  CHEST:  Normal respiratory excursion.  ABDOMEN:  Has some mild left lower quadrant tenderness.  SKIN:  Warm and dry.  BACK:  No tenderness.  No CVA tenderness.  EXTREMITIES:  Unremarkable.    LABORATORY DATA:  Have been discussed.  CBC grossly normal with no evidence of   an elevated white count or shift.  Chem panel was also normal with a   creatinine of 0.8.  Urinalysis does have greater than 150 rbcs and just 2-5   wbcs.  Leukocyte esterase and nitrite are negative.  He has moderate calcium   oxalate crystals in his urine and the CT scan report was as dictated.    IMPRESSION:  Left renal colic secondary to a large left ureteropelvic junction   calculus.    PLAN:  At this point, the patient has been n.p.o. most of the day.  We will   take him emergently to the operating room tonight for cystoscopy, left   ureteroscopy, lasertripsy of the calculus, and possible left ureteral stent   insertion.  Risks and benefits were  discussed with the patient and at this   point, we will proceed accordingly.       ____________________________________     MD ASHISH HUGHES / ROCHELLE    DD:  11/25/2019 19:23:27  DT:  11/25/2019 19:37:24    D#:  [id number]  Job#:  [id number]

## 2019-11-27 ENCOUNTER — HOSPITAL ENCOUNTER (OUTPATIENT)
Dept: LAB | Facility: MEDICAL CENTER | Age: 49
End: 2019-11-27
Attending: UROLOGY
Payer: COMMERCIAL

## 2019-11-27 PROCEDURE — 87086 URINE CULTURE/COLONY COUNT: CPT

## 2019-11-27 RX ORDER — OXYCODONE HYDROCHLORIDE AND ACETAMINOPHEN 5; 325 MG/1; MG/1
1-2 TABLET ORAL EVERY 4 HOURS PRN
Qty: 20 TAB | Refills: 0 | Status: SHIPPED | OUTPATIENT
Start: 2019-11-27 | End: 2019-11-27 | Stop reason: SDUPTHER

## 2019-11-27 RX ORDER — OXYCODONE HYDROCHLORIDE AND ACETAMINOPHEN 5; 325 MG/1; MG/1
1-2 TABLET ORAL EVERY 4 HOURS PRN
Qty: 20 TAB | Refills: 0 | Status: ON HOLD | OUTPATIENT
Start: 2019-11-27 | End: 2019-11-30

## 2019-11-29 LAB
BACTERIA UR CULT: NORMAL
SIGNIFICANT IND 70042: NORMAL
SITE SITE: NORMAL
SOURCE SOURCE: NORMAL

## 2019-11-30 ENCOUNTER — HOSPITAL ENCOUNTER (INPATIENT)
Facility: MEDICAL CENTER | Age: 49
LOS: 2 days | DRG: 661 | End: 2019-12-02
Attending: INTERNAL MEDICINE | Admitting: INTERNAL MEDICINE
Payer: COMMERCIAL

## 2019-11-30 ENCOUNTER — APPOINTMENT (OUTPATIENT)
Dept: RADIOLOGY | Facility: MEDICAL CENTER | Age: 49
DRG: 661 | End: 2019-11-30
Attending: INTERNAL MEDICINE
Payer: COMMERCIAL

## 2019-11-30 ENCOUNTER — HOSPITAL ENCOUNTER (EMERGENCY)
Facility: MEDICAL CENTER | Age: 49
End: 2019-11-30
Attending: EMERGENCY MEDICINE
Payer: COMMERCIAL

## 2019-11-30 VITALS
SYSTOLIC BLOOD PRESSURE: 122 MMHG | OXYGEN SATURATION: 92 % | TEMPERATURE: 98.2 F | DIASTOLIC BLOOD PRESSURE: 76 MMHG | BODY MASS INDEX: 41.75 KG/M2 | WEIGHT: 315 LBS | HEART RATE: 73 BPM | HEIGHT: 73 IN | RESPIRATION RATE: 16 BRPM

## 2019-11-30 DIAGNOSIS — Z46.6 ENCOUNTER FOR ADJUSTMENT OF URETERAL STENT: ICD-10-CM

## 2019-11-30 DIAGNOSIS — Z98.890 STATUS POST LASER LITHOTRIPSY OF URETERAL CALCULUS: ICD-10-CM

## 2019-11-30 DIAGNOSIS — N20.1 LEFT URETERAL STONE: ICD-10-CM

## 2019-11-30 DIAGNOSIS — R73.9 HYPERGLYCEMIA: ICD-10-CM

## 2019-11-30 DIAGNOSIS — N23 URETERAL COLIC: ICD-10-CM

## 2019-11-30 PROBLEM — N13.30 HYDRONEPHROSIS OF LEFT KIDNEY: Status: ACTIVE | Noted: 2019-11-30

## 2019-11-30 LAB
ALBUMIN SERPL BCP-MCNC: 4.5 G/DL (ref 3.2–4.9)
ALBUMIN/GLOB SERPL: 1.6 G/DL
ALP SERPL-CCNC: 85 U/L (ref 30–99)
ALT SERPL-CCNC: 30 U/L (ref 2–50)
ANION GAP SERPL CALC-SCNC: 15 MMOL/L (ref 0–11.9)
APPEARANCE UR: ABNORMAL
AST SERPL-CCNC: 19 U/L (ref 12–45)
BASOPHILS # BLD AUTO: 0.7 % (ref 0–1.8)
BASOPHILS # BLD: 0.08 K/UL (ref 0–0.12)
BILIRUB SERPL-MCNC: 0.3 MG/DL (ref 0.1–1.5)
BUN SERPL-MCNC: 18 MG/DL (ref 8–22)
CALCIUM SERPL-MCNC: 9.9 MG/DL (ref 8.4–10.2)
CHLORIDE SERPL-SCNC: 102 MMOL/L (ref 96–112)
CO2 SERPL-SCNC: 23 MMOL/L (ref 20–33)
COLOR UR: ABNORMAL
CREAT SERPL-MCNC: 0.94 MG/DL (ref 0.5–1.4)
EOSINOPHIL # BLD AUTO: 0.08 K/UL (ref 0–0.51)
EOSINOPHIL NFR BLD: 0.7 % (ref 0–6.9)
ERYTHROCYTE [DISTWIDTH] IN BLOOD BY AUTOMATED COUNT: 41.7 FL (ref 35.9–50)
GLOBULIN SER CALC-MCNC: 2.9 G/DL (ref 1.9–3.5)
GLUCOSE SERPL-MCNC: 143 MG/DL (ref 65–99)
HCT VFR BLD AUTO: 46 % (ref 42–52)
HGB BLD-MCNC: 15.7 G/DL (ref 14–18)
IMM GRANULOCYTES # BLD AUTO: 0.05 K/UL (ref 0–0.11)
IMM GRANULOCYTES NFR BLD AUTO: 0.4 % (ref 0–0.9)
LIPASE SERPL-CCNC: 26 U/L (ref 7–58)
LYMPHOCYTES # BLD AUTO: 1.33 K/UL (ref 1–4.8)
LYMPHOCYTES NFR BLD: 10.8 % (ref 22–41)
MCH RBC QN AUTO: 30.3 PG (ref 27–33)
MCHC RBC AUTO-ENTMCNC: 34.1 G/DL (ref 33.7–35.3)
MCV RBC AUTO: 88.6 FL (ref 81.4–97.8)
MICRO URNS: ABNORMAL
MONOCYTES # BLD AUTO: 0.67 K/UL (ref 0–0.85)
MONOCYTES NFR BLD AUTO: 5.5 % (ref 0–13.4)
NEUTROPHILS # BLD AUTO: 10.07 K/UL (ref 1.82–7.42)
NEUTROPHILS NFR BLD: 81.9 % (ref 44–72)
NRBC # BLD AUTO: 0 K/UL
NRBC BLD-RTO: 0 /100 WBC
PLATELET # BLD AUTO: 290 K/UL (ref 164–446)
PMV BLD AUTO: 9.4 FL (ref 9–12.9)
POTASSIUM SERPL-SCNC: 4.4 MMOL/L (ref 3.6–5.5)
PROT SERPL-MCNC: 7.4 G/DL (ref 6–8.2)
RBC # BLD AUTO: 5.19 M/UL (ref 4.7–6.1)
RBC # URNS HPF: >150 /HPF
SODIUM SERPL-SCNC: 140 MMOL/L (ref 135–145)
SP GR UR STRIP.AUTO: 1.02
WBC # BLD AUTO: 12.3 K/UL (ref 4.8–10.8)
WBC #/AREA URNS HPF: ABNORMAL /HPF

## 2019-11-30 PROCEDURE — 700111 HCHG RX REV CODE 636 W/ 250 OVERRIDE (IP): Performed by: INTERNAL MEDICINE

## 2019-11-30 PROCEDURE — 85025 COMPLETE CBC W/AUTO DIFF WBC: CPT

## 2019-11-30 PROCEDURE — 99284 EMERGENCY DEPT VISIT MOD MDM: CPT

## 2019-11-30 PROCEDURE — 81001 URINALYSIS AUTO W/SCOPE: CPT

## 2019-11-30 PROCEDURE — 96375 TX/PRO/DX INJ NEW DRUG ADDON: CPT

## 2019-11-30 PROCEDURE — 700111 HCHG RX REV CODE 636 W/ 250 OVERRIDE (IP): Performed by: EMERGENCY MEDICINE

## 2019-11-30 PROCEDURE — 700111 HCHG RX REV CODE 636 W/ 250 OVERRIDE (IP)

## 2019-11-30 PROCEDURE — BT42ZZZ ULTRASONOGRAPHY OF LEFT KIDNEY: ICD-10-PCS | Performed by: RADIOLOGY

## 2019-11-30 PROCEDURE — 83690 ASSAY OF LIPASE: CPT

## 2019-11-30 PROCEDURE — 96374 THER/PROPH/DIAG INJ IV PUSH: CPT

## 2019-11-30 PROCEDURE — 80053 COMPREHEN METABOLIC PANEL: CPT

## 2019-11-30 PROCEDURE — 99152 MOD SED SAME PHYS/QHP 5/>YRS: CPT

## 2019-11-30 PROCEDURE — 99223 1ST HOSP IP/OBS HIGH 75: CPT | Performed by: INTERNAL MEDICINE

## 2019-11-30 PROCEDURE — 700105 HCHG RX REV CODE 258: Performed by: INTERNAL MEDICINE

## 2019-11-30 PROCEDURE — 770006 HCHG ROOM/CARE - MED/SURG/GYN SEMI*

## 2019-11-30 RX ORDER — AMOXICILLIN 250 MG
2 CAPSULE ORAL 2 TIMES DAILY
Status: CANCELLED | OUTPATIENT
Start: 2019-11-30

## 2019-11-30 RX ORDER — PROMETHAZINE HYDROCHLORIDE 25 MG/1
12.5-25 SUPPOSITORY RECTAL EVERY 4 HOURS PRN
Status: CANCELLED | OUTPATIENT
Start: 2019-11-30

## 2019-11-30 RX ORDER — HYDROMORPHONE HYDROCHLORIDE 1 MG/ML
1 INJECTION, SOLUTION INTRAMUSCULAR; INTRAVENOUS; SUBCUTANEOUS
Status: DISCONTINUED | OUTPATIENT
Start: 2019-11-30 | End: 2019-11-30 | Stop reason: HOSPADM

## 2019-11-30 RX ORDER — ONDANSETRON 2 MG/ML
4 INJECTION INTRAMUSCULAR; INTRAVENOUS EVERY 4 HOURS PRN
Status: CANCELLED | OUTPATIENT
Start: 2019-11-30

## 2019-11-30 RX ORDER — HYDROMORPHONE HYDROCHLORIDE 1 MG/ML
1 INJECTION, SOLUTION INTRAMUSCULAR; INTRAVENOUS; SUBCUTANEOUS ONCE
Status: COMPLETED | OUTPATIENT
Start: 2019-11-30 | End: 2019-11-30

## 2019-11-30 RX ORDER — SODIUM CHLORIDE 9 MG/ML
INJECTION, SOLUTION INTRAVENOUS CONTINUOUS
Status: CANCELLED | OUTPATIENT
Start: 2019-11-30

## 2019-11-30 RX ORDER — MIDAZOLAM HYDROCHLORIDE 1 MG/ML
INJECTION INTRAMUSCULAR; INTRAVENOUS
Status: COMPLETED
Start: 2019-11-30 | End: 2019-11-30

## 2019-11-30 RX ORDER — ONDANSETRON 4 MG/1
4 TABLET, ORALLY DISINTEGRATING ORAL EVERY 4 HOURS PRN
Status: CANCELLED | OUTPATIENT
Start: 2019-11-30

## 2019-11-30 RX ORDER — AZELASTINE HYDROCHLORIDE 0.5 MG/ML
SOLUTION/ DROPS OPHTHALMIC
Status: SHIPPED | COMMUNITY
Start: 2019-11-06 | End: 2019-11-30

## 2019-11-30 RX ORDER — ENALAPRILAT 1.25 MG/ML
1.25 INJECTION INTRAVENOUS EVERY 6 HOURS PRN
Status: DISCONTINUED | OUTPATIENT
Start: 2019-11-30 | End: 2019-12-02 | Stop reason: HOSPADM

## 2019-11-30 RX ORDER — KETOROLAC TROMETHAMINE 30 MG/ML
15 INJECTION, SOLUTION INTRAMUSCULAR; INTRAVENOUS EVERY 6 HOURS PRN
Status: CANCELLED | OUTPATIENT
Start: 2019-11-30 | End: 2019-12-05

## 2019-11-30 RX ORDER — ONDANSETRON 2 MG/ML
4 INJECTION INTRAMUSCULAR; INTRAVENOUS EVERY 4 HOURS PRN
Status: DISCONTINUED | OUTPATIENT
Start: 2019-11-30 | End: 2019-11-30 | Stop reason: HOSPADM

## 2019-11-30 RX ORDER — ENALAPRILAT 1.25 MG/ML
1.25 INJECTION INTRAVENOUS EVERY 6 HOURS PRN
Status: DISCONTINUED | OUTPATIENT
Start: 2019-11-30 | End: 2019-11-30 | Stop reason: HOSPADM

## 2019-11-30 RX ORDER — HYDROMORPHONE HYDROCHLORIDE 1 MG/ML
1 INJECTION, SOLUTION INTRAMUSCULAR; INTRAVENOUS; SUBCUTANEOUS
Status: CANCELLED | OUTPATIENT
Start: 2019-11-30

## 2019-11-30 RX ORDER — ACETAMINOPHEN 500 MG
500-1000 TABLET ORAL EVERY 6 HOURS PRN
COMMUNITY

## 2019-11-30 RX ORDER — BISACODYL 10 MG
10 SUPPOSITORY, RECTAL RECTAL
Status: CANCELLED | OUTPATIENT
Start: 2019-11-30

## 2019-11-30 RX ORDER — NAPROXEN SODIUM 220 MG
220 TABLET ORAL 2 TIMES DAILY PRN
Status: ON HOLD | COMMUNITY
End: 2019-11-30

## 2019-11-30 RX ORDER — BISACODYL 10 MG
10 SUPPOSITORY, RECTAL RECTAL
Status: DISCONTINUED | OUTPATIENT
Start: 2019-11-30 | End: 2019-12-02 | Stop reason: HOSPADM

## 2019-11-30 RX ORDER — ONDANSETRON 2 MG/ML
4 INJECTION INTRAMUSCULAR; INTRAVENOUS PRN
Status: ACTIVE | OUTPATIENT
Start: 2019-11-30 | End: 2019-11-30

## 2019-11-30 RX ORDER — AMOXICILLIN 250 MG
2 CAPSULE ORAL 2 TIMES DAILY
Status: DISCONTINUED | OUTPATIENT
Start: 2019-11-30 | End: 2019-12-02 | Stop reason: HOSPADM

## 2019-11-30 RX ORDER — PROMETHAZINE HYDROCHLORIDE 25 MG/1
12.5-25 SUPPOSITORY RECTAL EVERY 4 HOURS PRN
Status: DISCONTINUED | OUTPATIENT
Start: 2019-11-30 | End: 2019-11-30 | Stop reason: HOSPADM

## 2019-11-30 RX ORDER — OXYCODONE HYDROCHLORIDE AND ACETAMINOPHEN 5; 325 MG/1; MG/1
1 TABLET ORAL EVERY 4 HOURS PRN
Status: CANCELLED | OUTPATIENT
Start: 2019-11-30

## 2019-11-30 RX ORDER — PROMETHAZINE HYDROCHLORIDE 25 MG/1
12.5-25 TABLET ORAL EVERY 4 HOURS PRN
Status: CANCELLED | OUTPATIENT
Start: 2019-11-30

## 2019-11-30 RX ORDER — SODIUM CHLORIDE 9 MG/ML
INJECTION, SOLUTION INTRAVENOUS CONTINUOUS
Status: DISCONTINUED | OUTPATIENT
Start: 2019-11-30 | End: 2019-12-02 | Stop reason: HOSPADM

## 2019-11-30 RX ORDER — BISACODYL 10 MG
10 SUPPOSITORY, RECTAL RECTAL
Status: DISCONTINUED | OUTPATIENT
Start: 2019-11-30 | End: 2019-11-30 | Stop reason: HOSPADM

## 2019-11-30 RX ORDER — PROMETHAZINE HYDROCHLORIDE 25 MG/1
12.5-25 TABLET ORAL EVERY 4 HOURS PRN
Status: DISCONTINUED | OUTPATIENT
Start: 2019-11-30 | End: 2019-11-30 | Stop reason: HOSPADM

## 2019-11-30 RX ORDER — ONDANSETRON 4 MG/1
4 TABLET, ORALLY DISINTEGRATING ORAL EVERY 4 HOURS PRN
Status: DISCONTINUED | OUTPATIENT
Start: 2019-11-30 | End: 2019-12-02 | Stop reason: HOSPADM

## 2019-11-30 RX ORDER — ONDANSETRON 2 MG/ML
4 INJECTION INTRAMUSCULAR; INTRAVENOUS ONCE
Status: COMPLETED | OUTPATIENT
Start: 2019-11-30 | End: 2019-11-30

## 2019-11-30 RX ORDER — POLYETHYLENE GLYCOL 3350 17 G/17G
1 POWDER, FOR SOLUTION ORAL
Status: CANCELLED | OUTPATIENT
Start: 2019-11-30

## 2019-11-30 RX ORDER — OXYCODONE HYDROCHLORIDE AND ACETAMINOPHEN 5; 325 MG/1; MG/1
1 TABLET ORAL EVERY 4 HOURS PRN
Status: DISCONTINUED | OUTPATIENT
Start: 2019-11-30 | End: 2019-11-30 | Stop reason: HOSPADM

## 2019-11-30 RX ORDER — KETOROLAC TROMETHAMINE 30 MG/ML
15 INJECTION, SOLUTION INTRAMUSCULAR; INTRAVENOUS EVERY 6 HOURS PRN
Status: DISCONTINUED | OUTPATIENT
Start: 2019-11-30 | End: 2019-11-30 | Stop reason: HOSPADM

## 2019-11-30 RX ORDER — PROCHLORPERAZINE EDISYLATE 5 MG/ML
5-10 INJECTION INTRAMUSCULAR; INTRAVENOUS EVERY 4 HOURS PRN
Status: DISCONTINUED | OUTPATIENT
Start: 2019-11-30 | End: 2019-12-02 | Stop reason: HOSPADM

## 2019-11-30 RX ORDER — PROMETHAZINE HYDROCHLORIDE 25 MG/1
12.5-25 TABLET ORAL EVERY 4 HOURS PRN
Status: DISCONTINUED | OUTPATIENT
Start: 2019-11-30 | End: 2019-12-02 | Stop reason: HOSPADM

## 2019-11-30 RX ORDER — ONDANSETRON 2 MG/ML
4 INJECTION INTRAMUSCULAR; INTRAVENOUS EVERY 4 HOURS PRN
Status: DISCONTINUED | OUTPATIENT
Start: 2019-11-30 | End: 2019-12-02 | Stop reason: HOSPADM

## 2019-11-30 RX ORDER — KETOROLAC TROMETHAMINE 30 MG/ML
INJECTION, SOLUTION INTRAMUSCULAR; INTRAVENOUS
Status: COMPLETED
Start: 2019-11-30 | End: 2019-11-30

## 2019-11-30 RX ORDER — ONDANSETRON 4 MG/1
4 TABLET, ORALLY DISINTEGRATING ORAL EVERY 4 HOURS PRN
Status: DISCONTINUED | OUTPATIENT
Start: 2019-11-30 | End: 2019-11-30 | Stop reason: HOSPADM

## 2019-11-30 RX ORDER — ONDANSETRON 2 MG/ML
INJECTION INTRAMUSCULAR; INTRAVENOUS
Status: COMPLETED
Start: 2019-11-30 | End: 2019-11-30

## 2019-11-30 RX ORDER — SODIUM CHLORIDE 9 MG/ML
500 INJECTION, SOLUTION INTRAVENOUS
Status: ACTIVE | OUTPATIENT
Start: 2019-11-30 | End: 2019-11-30

## 2019-11-30 RX ORDER — KETOROLAC TROMETHAMINE 30 MG/ML
15 INJECTION, SOLUTION INTRAMUSCULAR; INTRAVENOUS EVERY 6 HOURS PRN
Status: DISCONTINUED | OUTPATIENT
Start: 2019-11-30 | End: 2019-12-02 | Stop reason: HOSPADM

## 2019-11-30 RX ORDER — HYDROMORPHONE HYDROCHLORIDE 1 MG/ML
1 INJECTION, SOLUTION INTRAMUSCULAR; INTRAVENOUS; SUBCUTANEOUS
Status: DISCONTINUED | OUTPATIENT
Start: 2019-11-30 | End: 2019-12-02 | Stop reason: HOSPADM

## 2019-11-30 RX ORDER — PROMETHAZINE HYDROCHLORIDE 12.5 MG/1
12.5-25 SUPPOSITORY RECTAL EVERY 4 HOURS PRN
Status: DISCONTINUED | OUTPATIENT
Start: 2019-11-30 | End: 2019-12-02 | Stop reason: HOSPADM

## 2019-11-30 RX ORDER — POLYETHYLENE GLYCOL 3350 17 G/17G
1 POWDER, FOR SOLUTION ORAL
Status: DISCONTINUED | OUTPATIENT
Start: 2019-11-30 | End: 2019-12-02 | Stop reason: HOSPADM

## 2019-11-30 RX ORDER — SODIUM CHLORIDE 9 MG/ML
INJECTION, SOLUTION INTRAVENOUS CONTINUOUS
Status: DISCONTINUED | OUTPATIENT
Start: 2019-11-30 | End: 2019-11-30 | Stop reason: HOSPADM

## 2019-11-30 RX ORDER — AMOXICILLIN 250 MG
2 CAPSULE ORAL 2 TIMES DAILY
Status: DISCONTINUED | OUTPATIENT
Start: 2019-11-30 | End: 2019-11-30 | Stop reason: HOSPADM

## 2019-11-30 RX ORDER — ENALAPRILAT 1.25 MG/ML
1.25 INJECTION INTRAVENOUS EVERY 6 HOURS PRN
Status: CANCELLED | OUTPATIENT
Start: 2019-11-30

## 2019-11-30 RX ORDER — OXYCODONE HYDROCHLORIDE AND ACETAMINOPHEN 5; 325 MG/1; MG/1
1 TABLET ORAL EVERY 4 HOURS PRN
Status: DISCONTINUED | OUTPATIENT
Start: 2019-11-30 | End: 2019-12-02 | Stop reason: HOSPADM

## 2019-11-30 RX ORDER — POLYETHYLENE GLYCOL 3350 17 G/17G
1 POWDER, FOR SOLUTION ORAL
Status: DISCONTINUED | OUTPATIENT
Start: 2019-11-30 | End: 2019-11-30 | Stop reason: HOSPADM

## 2019-11-30 RX ORDER — MIDAZOLAM HYDROCHLORIDE 1 MG/ML
.5-2 INJECTION INTRAMUSCULAR; INTRAVENOUS PRN
Status: ACTIVE | OUTPATIENT
Start: 2019-11-30 | End: 2019-11-30

## 2019-11-30 RX ORDER — PROCHLORPERAZINE EDISYLATE 5 MG/ML
5-10 INJECTION INTRAMUSCULAR; INTRAVENOUS EVERY 4 HOURS PRN
Status: CANCELLED | OUTPATIENT
Start: 2019-11-30

## 2019-11-30 RX ORDER — PROCHLORPERAZINE EDISYLATE 5 MG/ML
5-10 INJECTION INTRAMUSCULAR; INTRAVENOUS EVERY 4 HOURS PRN
Status: DISCONTINUED | OUTPATIENT
Start: 2019-11-30 | End: 2019-11-30 | Stop reason: HOSPADM

## 2019-11-30 RX ORDER — KETOROLAC TROMETHAMINE 30 MG/ML
15 INJECTION, SOLUTION INTRAMUSCULAR; INTRAVENOUS ONCE
Status: COMPLETED | OUTPATIENT
Start: 2019-11-30 | End: 2019-11-30

## 2019-11-30 RX ADMIN — FENTANYL CITRATE 50 MCG: 0.05 INJECTION, SOLUTION INTRAMUSCULAR; INTRAVENOUS at 18:25

## 2019-11-30 RX ADMIN — SODIUM CHLORIDE: 900 INJECTION INTRAVENOUS at 16:25

## 2019-11-30 RX ADMIN — HYDROMORPHONE HYDROCHLORIDE 1 MG: 1 INJECTION, SOLUTION INTRAMUSCULAR; INTRAVENOUS; SUBCUTANEOUS at 21:51

## 2019-11-30 RX ADMIN — HYDROMORPHONE HYDROCHLORIDE 1 MG: 1 INJECTION, SOLUTION INTRAMUSCULAR; INTRAVENOUS; SUBCUTANEOUS at 14:21

## 2019-11-30 RX ADMIN — MIDAZOLAM HYDROCHLORIDE 1 MG: 1 INJECTION, SOLUTION INTRAMUSCULAR; INTRAVENOUS at 18:25

## 2019-11-30 RX ADMIN — KETOROLAC TROMETHAMINE 15 MG: 30 INJECTION, SOLUTION INTRAMUSCULAR at 14:15

## 2019-11-30 RX ADMIN — ONDANSETRON 4 MG: 2 INJECTION INTRAMUSCULAR; INTRAVENOUS at 14:18

## 2019-11-30 RX ADMIN — MIDAZOLAM HYDROCHLORIDE 1 MG: 1 INJECTION INTRAMUSCULAR; INTRAVENOUS at 18:25

## 2019-11-30 RX ADMIN — KETOROLAC TROMETHAMINE 15 MG: 30 INJECTION, SOLUTION INTRAMUSCULAR; INTRAVENOUS at 14:15

## 2019-11-30 ASSESSMENT — LIFESTYLE VARIABLES
TOTAL SCORE: 0
EVER_SMOKED: NEVER
HAVE YOU EVER FELT YOU SHOULD CUT DOWN ON YOUR DRINKING: NO
CONSUMPTION TOTAL: INCOMPLETE
ALCOHOL_USE: NO
TOTAL SCORE: 0
HAVE YOU EVER FELT YOU SHOULD CUT DOWN ON YOUR DRINKING: NO
EVER FELT BAD OR GUILTY ABOUT YOUR DRINKING: NO
EVER FELT BAD OR GUILTY ABOUT YOUR DRINKING: NO
TOTAL SCORE: 0
TOTAL SCORE: 0
CONSUMPTION TOTAL: INCOMPLETE
EVER HAD A DRINK FIRST THING IN THE MORNING TO STEADY YOUR NERVES TO GET RID OF A HANGOVER: NO
ALCOHOL_USE: NO
EVER HAD A DRINK FIRST THING IN THE MORNING TO STEADY YOUR NERVES TO GET RID OF A HANGOVER: NO
HAVE PEOPLE ANNOYED YOU BY CRITICIZING YOUR DRINKING: NO
HAVE PEOPLE ANNOYED YOU BY CRITICIZING YOUR DRINKING: NO
TOTAL SCORE: 0
TOTAL SCORE: 0

## 2019-11-30 ASSESSMENT — COGNITIVE AND FUNCTIONAL STATUS - GENERAL
SUGGESTED CMS G CODE MODIFIER DAILY ACTIVITY: CH
SUGGESTED CMS G CODE MODIFIER MOBILITY: CH
MOBILITY SCORE: 24
DAILY ACTIVITIY SCORE: 24

## 2019-11-30 ASSESSMENT — ENCOUNTER SYMPTOMS
FEVER: 0
DIARRHEA: 0
DIZZINESS: 0
FLANK PAIN: 1
ABDOMINAL PAIN: 1
INSOMNIA: 0
VOMITING: 1
MYALGIAS: 0
STRIDOR: 0
CONSTIPATION: 0
WHEEZING: 0
PALPITATIONS: 0
NERVOUS/ANXIOUS: 0
SINUS PAIN: 0
CHILLS: 0
DIAPHORESIS: 0
COUGH: 0
SHORTNESS OF BREATH: 0
TREMORS: 0
HEADACHES: 0
NAUSEA: 1
SORE THROAT: 0

## 2019-11-30 ASSESSMENT — PATIENT HEALTH QUESTIONNAIRE - PHQ9
SUM OF ALL RESPONSES TO PHQ9 QUESTIONS 1 AND 2: 0
2. FEELING DOWN, DEPRESSED, IRRITABLE, OR HOPELESS: NOT AT ALL
1. LITTLE INTEREST OR PLEASURE IN DOING THINGS: NOT AT ALL

## 2019-11-30 NOTE — ED NOTES
Medication reconciliation has been completed by interviewing patient with consent to do so with family/visitors in the room.   Allergies were reviewed   No ORAL antibiotics within the past 14 days  Pt home pharmacy:[facility]

## 2019-11-30 NOTE — ED PROVIDER NOTES
"ED Provider Note    CHIEF COMPLAINT  Chief Complaint   Patient presents with   • Flank Pain       HPI  Jae Brandon is a 49 y.o. male who presents with severe 10/10 left flank pain and vomiting.  The patient on November 25, 2019, 5 days ago was diagnosed with an 11 mm proximal left ureteral stone, he underwent procedure by Dr. Bobby Ball where he found a stenotic left ureter.  A stent was placed.  The patient said he was doing pretty well from a pain perspective but then today at 5 AM had the worst pain is had in his life in his left flank.  This was associate with vomiting.  The pain came on all of a sudden.  He denies fever.    REVIEW OF SYSTEMS  See HPI for further details. All other systems are negative.     PAST MEDICAL HISTORY   The patient denies    SOCIAL HISTORY  Social History     Tobacco Use   • Smoking status: Never Smoker   • Smokeless tobacco: Never Used   Substance and Sexual Activity   • Alcohol use: Not Currently   • Drug use: Not Currently   • Sexual activity: Not on file       SURGICAL HISTORY   has a past surgical history that includes cystoscopy (Left, 11/25/2019); ureteroscopy (Left, 11/25/2019); lasertripsy (Left, 11/25/2019); and stent placement (Left, 11/25/2019).    CURRENT MEDICATIONS  Percocet    ALLERGIES  No Known Allergies    PHYSICAL EXAM  VITAL SIGNS: /95   Pulse 74   Temp 36.6 °C (97.8 °F) (Temporal)   Resp (!) 22   Ht 1.854 m (6' 1\")   Wt (!) 144 kg (317 lb 7.4 oz)   SpO2 97%   BMI 41.88 kg/m²  @JOSE ALFREDO[[id number]::@   Pulse ox interpretation: I interpret this pulse ox as normal.  Constitutional: Alert in no apparent distress.  HENT: No signs of trauma, Bilateral external ears normal, Nose normal.   Eyes: Pupils are equal and reactive, Conjunctiva normal, Non-icteric.   Neck: Normal range of motion, No tenderness, Supple, No stridor.   Lymphatic: No lymphadenopathy noted.   Cardiovascular: Regular rate and rhythm, no murmurs.   Thorax & Lungs: Normal breath sounds, No " respiratory distress, No wheezing, No chest tenderness.   Abdomen: Bowel sounds normal, Soft, No tenderness, No masses, No pulsatile masses. No peritoneal signs.  Skin: Warm, Dry, No erythema, No rash.   Back: No bony tenderness, No CVA tenderness.   Extremities: Intact distal pulses, No edema, No tenderness, No cyanosis.  Musculoskeletal: Good range of motion in all major joints. No tenderness to palpation or major deformities noted.   Neurologic: Alert , Normal motor function, Normal sensory function, No focal deficits noted.   Psychiatric: Affect normal, Judgment normal, Mood normal.       DIAGNOSTIC STUDIES / PROCEDURES        LABS  Labs Reviewed   CBC WITH DIFFERENTIAL - Abnormal; Notable for the following components:       Result Value    WBC 12.3 (*)     Neutrophils-Polys 81.90 (*)     Lymphocytes 10.80 (*)     Neutrophils (Absolute) 10.07 (*)     All other components within normal limits   COMP METABOLIC PANEL - Abnormal; Notable for the following components:    Anion Gap 15.0 (*)     Glucose 143 (*)     All other components within normal limits   URINALYSIS,CULTURE IF INDICATED - Abnormal; Notable for the following components:    Character Cloudy (*)     All other components within normal limits   URINE MICROSCOPIC (W/UA) - Abnormal; Notable for the following components:    WBC 2-5 (*)     RBC >150 (*)     All other components within normal limits   LIPASE   ESTIMATED GFR               COURSE & MEDICAL DECISION MAKING  Pertinent Labs & Imaging studies reviewed. (See chart for details)    The patient was given Toradol 15 mg IV, Dilaudid 1 mg IV, Zofran 4 mg IV.    The patient's labs do not indicate urine infection.    2:05 PM Dr. Logan pagemari from urology.    2:10 PM I spoke with Dr. Logan, he would like the patient transferred to [facility] so the nephrostomy tube can be placed if the stent is failing.    I spoke with Dr. Mai who will make the patient a direct admit to [facility] for intractable pain and  nephrostomy tube placement.          FINAL IMPRESSION  1. Ureteral colic              Intractable left-sided with vomiting           Electronically signed by: Robel Poon, 11/30/2019 2:03 PM

## 2019-11-30 NOTE — H&P
Hospital Medicine History & Physical Note    Date of Service  11/30/2019    Primary Care Physician  Karl Mercado M.D.    Consultants  Urology Dr. Logan  IR Dr. Rocha    Code Status  full    Chief Complaint  Abdominal pain    History of Presenting Illness  49 y.o. male who presented 11/30/2019 with severe abdominal pain and left flank pain similar to his previous pain on November 25 when he was diagnosed with a 10mm left UPJ stone. He had cystoscopy with Dr. Whitehead with stent placement and attempted ureteral dilation as he had a narrow ureter. The patient states early this morning his pain recurred but is much more severe in nature. He has nausea associated with his pain. He denies any fever, chills, diarrhea, headache or rash.    Review of Systems  Review of Systems   Constitutional: Negative for chills, diaphoresis, fever and malaise/fatigue.   HENT: Negative for sinus pain and sore throat.    Respiratory: Negative for cough, shortness of breath, wheezing and stridor.    Cardiovascular: Negative for chest pain, palpitations and leg swelling.   Gastrointestinal: Positive for abdominal pain, nausea and vomiting. Negative for constipation and diarrhea.   Genitourinary: Positive for flank pain. Negative for dysuria, frequency and hematuria.   Musculoskeletal: Negative for joint pain and myalgias.   Skin: Negative for itching and rash.   Neurological: Negative for dizziness, tremors and headaches.   Psychiatric/Behavioral: The patient is not nervous/anxious and does not have insomnia.    All other systems reviewed and are negative.      Past Medical History  Renal stone as above    Surgical History   has a past surgical history that includes cystoscopy (Left, 11/25/2019); ureteroscopy (Left, 11/25/2019); lasertripsy (Left, 11/25/2019); and stent placement (Left, 11/25/2019).     Family History  Brother has kidney stones    Social History   reports that he has never smoked. He has never used smokeless  tobacco. He reports previous alcohol use. He reports previous drug use.    Allergies  No Known Allergies    Medications  Prior to Admission Medications   Prescriptions Last Dose Informant Patient Reported? Taking?   azelastine (OPTIVAR) 0.05 % ophthalmic solution   Yes No   oxyCODONE-acetaminophen (PERCOCET) 5-325 MG Tab   No No   Sig: Take 1-2 Tabs by mouth every four hours as needed for up to 5 days.      Facility-Administered Medications: None       Physical Exam  Temp:  [36.6 °C (97.8 °F)] 36.6 °C (97.8 °F)  Pulse:  [74-85] 85  Resp:  [18-22] 18  BP: (130-146)/(80-95) 130/80  SpO2:  [95 %-97 %] 95 %    Physical Exam  Vitals signs and nursing note reviewed.   Constitutional:       General: He is in acute distress.      Appearance: He is not diaphoretic.   HENT:      Head: Atraumatic.      Nose: No congestion or rhinorrhea.      Mouth/Throat:      Mouth: Mucous membranes are dry.      Pharynx: No oropharyngeal exudate or posterior oropharyngeal erythema.   Eyes:      Extraocular Movements: Extraocular movements intact.      Conjunctiva/sclera: Conjunctivae normal.      Pupils: Pupils are equal, round, and reactive to light.   Neck:      Musculoskeletal: No neck rigidity or muscular tenderness.   Cardiovascular:      Rate and Rhythm: Normal rate and regular rhythm.      Pulses: Normal pulses.      Heart sounds: Normal heart sounds. No murmur.   Pulmonary:      Effort: No respiratory distress.      Breath sounds: Normal breath sounds. No stridor. No wheezing.   Abdominal:      General: Bowel sounds are normal.      Palpations: Abdomen is soft.      Tenderness: There is no tenderness.   Musculoskeletal:         General: No swelling or deformity.   Skin:     General: Skin is dry.      Capillary Refill: Capillary refill takes less than 2 seconds.      Coloration: Skin is not pale.      Findings: No erythema.   Neurological:      General: No focal deficit present.      Mental Status: He is alert.      Motor: No  weakness.      Gait: Gait normal.   Psychiatric:         Mood and Affect: Mood normal.         Behavior: Behavior normal.         Thought Content: Thought content normal.         Laboratory:  Recent Labs     11/30/19  1328   WBC 12.3*   RBC 5.19   HEMOGLOBIN 15.7   HEMATOCRIT 46.0   MCV 88.6   MCH 30.3   MCHC 34.1   RDW 41.7   PLATELETCT 290   MPV 9.4     Recent Labs     11/30/19  1328   SODIUM 140   POTASSIUM 4.4   CHLORIDE 102   CO2 23   GLUCOSE 143*   BUN 18   CREATININE 0.94   CALCIUM 9.9     Recent Labs     11/30/19  1328   ALTSGPT 30   ASTSGOT 19   ALKPHOSPHAT 85   TBILIRUBIN 0.3   LIPASE 26   GLUCOSE 143*         No results for input(s): NTPROBNP in the last 72 hours.      No results for input(s): TROPONINT in the last 72 hours.    Urinalysis:    Recent Labs     11/30/19  1318   SPECGRAVITY 1.025   WBCURINE 2-5*   RBCURINE >150*        Imaging:  No orders to display         Assessment/Plan:  I anticipate this patient will require at least two midnights for appropriate medical management, necessitating inpatient admission.    Hyperglycemia  Assessment & Plan  Acute stress response, will monitor level    Left ureteral stone  Assessment & Plan  10mm on imaging, will treat with nephrostomy tube and pain control    Hydronephrosis of left kidney- (present on admission)  Assessment & Plan  Patient had a ureteral stone placed 11/25 with Dr. Whitehead and was noted to have a narrow left ureter and lithotripsy was not able to be done  The patient was discussed with Dr. Logan who recommends nephrostomy tube placement, I discussed with Dr. Rocha from radiology  I will order pain medication as needed for discomfort        VTE prophylaxis: none for surgical intervention

## 2019-11-30 NOTE — PROGRESS NOTES
Direct admit from [facility]. Referring/accepting is Dr. Mai for Lt hydronephrosis.  Discharge and readmit orders signed and held--please release and implement upon pt arrival. Page the direct admit on call hospitalist to notify that patient has arrived & for any acute changes. Call referring/accepting MD from [facility] for any concerns related to the admission orders

## 2019-11-30 NOTE — ED NOTES
Patient resting in bed with family at bedside; Patient/familiy updated on plan of care; Monitoring continued

## 2019-11-30 NOTE — ED NOTES
Patient provided transfer paperwork and instructions for direct admission; Steady gait; Up ad harleen; Left with family driving patient to [facility].

## 2019-11-30 NOTE — ED NOTES
Care handoff given to Lisa RN at [facility] using SBAR: Patient being transported via private vehicle for direct admission (MD approved); 20G L AC IV left in place per MD; wrapped and secured with coban for transfer. Patient A&O x4; ABC's intact.

## 2019-11-30 NOTE — PROGRESS NOTES
Pt arrived from [facility] around 1530, pt AOx4, lungs clear on RA, currently NPO awaiting procedure in IR. Skin intact, no wounds or pressure injury on arrival. Bed locked in lowest position, call light within reach. Cooperative with care, safety maintained. Family at bedside.

## 2019-11-30 NOTE — ED TRIAGE NOTES
"Presents accompanied by his spouse.  He C/O recurring left flank pain with diagnosed kidney stones, and a stent placement this past Monday.  He returns today with escalating, severe pain.  Chief Complaint   Patient presents with   • Flank Pain     /95   Pulse 74   Temp 36.6 °C (97.8 °F) (Temporal)   Resp (!) 22   Ht 1.854 m (6' 1\")   Wt (!) 144 kg (317 lb 7.4 oz)   SpO2 97%   BMI 41.88 kg/m²       "

## 2019-12-01 ENCOUNTER — APPOINTMENT (OUTPATIENT)
Dept: RADIOLOGY | Facility: MEDICAL CENTER | Age: 49
DRG: 661 | End: 2019-12-01
Attending: UROLOGY
Payer: COMMERCIAL

## 2019-12-01 ENCOUNTER — APPOINTMENT (OUTPATIENT)
Dept: RADIOLOGY | Facility: MEDICAL CENTER | Age: 49
DRG: 661 | End: 2019-12-01
Attending: HOSPITALIST
Payer: COMMERCIAL

## 2019-12-01 ENCOUNTER — ANESTHESIA (OUTPATIENT)
Dept: SURGERY | Facility: MEDICAL CENTER | Age: 49
DRG: 661 | End: 2019-12-01
Payer: COMMERCIAL

## 2019-12-01 ENCOUNTER — ANESTHESIA EVENT (OUTPATIENT)
Dept: SURGERY | Facility: MEDICAL CENTER | Age: 49
DRG: 661 | End: 2019-12-01
Payer: COMMERCIAL

## 2019-12-01 PROBLEM — R73.9 HYPERGLYCEMIA: Chronic | Status: ACTIVE | Noted: 2019-11-30

## 2019-12-01 LAB
ANION GAP SERPL CALC-SCNC: 8 MMOL/L (ref 0–11.9)
BASOPHILS # BLD AUTO: 0.5 % (ref 0–1.8)
BASOPHILS # BLD: 0.05 K/UL (ref 0–0.12)
BUN SERPL-MCNC: 15 MG/DL (ref 8–22)
CALCIUM SERPL-MCNC: 8.6 MG/DL (ref 8.5–10.5)
CHLORIDE SERPL-SCNC: 109 MMOL/L (ref 96–112)
CO2 SERPL-SCNC: 23 MMOL/L (ref 20–33)
CREAT SERPL-MCNC: 0.94 MG/DL (ref 0.5–1.4)
EOSINOPHIL # BLD AUTO: 0.18 K/UL (ref 0–0.51)
EOSINOPHIL NFR BLD: 2 % (ref 0–6.9)
ERYTHROCYTE [DISTWIDTH] IN BLOOD BY AUTOMATED COUNT: 44.5 FL (ref 35.9–50)
GLUCOSE SERPL-MCNC: 101 MG/DL (ref 65–99)
HCT VFR BLD AUTO: 41 % (ref 42–52)
HGB BLD-MCNC: 13.9 G/DL (ref 14–18)
IMM GRANULOCYTES # BLD AUTO: 0.01 K/UL (ref 0–0.11)
IMM GRANULOCYTES NFR BLD AUTO: 0.1 % (ref 0–0.9)
LYMPHOCYTES # BLD AUTO: 2.81 K/UL (ref 1–4.8)
LYMPHOCYTES NFR BLD: 30.6 % (ref 22–41)
MCH RBC QN AUTO: 30.9 PG (ref 27–33)
MCHC RBC AUTO-ENTMCNC: 33.9 G/DL (ref 33.7–35.3)
MCV RBC AUTO: 91.1 FL (ref 81.4–97.8)
MONOCYTES # BLD AUTO: 1.02 K/UL (ref 0–0.85)
MONOCYTES NFR BLD AUTO: 11.1 % (ref 0–13.4)
NEUTROPHILS # BLD AUTO: 5.11 K/UL (ref 1.82–7.42)
NEUTROPHILS NFR BLD: 55.7 % (ref 44–72)
NRBC # BLD AUTO: 0 K/UL
NRBC BLD-RTO: 0 /100 WBC
PLATELET # BLD AUTO: 290 K/UL (ref 164–446)
PMV BLD AUTO: 9.5 FL (ref 9–12.9)
POTASSIUM SERPL-SCNC: 3.9 MMOL/L (ref 3.6–5.5)
RBC # BLD AUTO: 4.5 M/UL (ref 4.7–6.1)
SODIUM SERPL-SCNC: 140 MMOL/L (ref 135–145)
WBC # BLD AUTO: 9.2 K/UL (ref 4.8–10.8)

## 2019-12-01 PROCEDURE — 700102 HCHG RX REV CODE 250 W/ 637 OVERRIDE(OP): Performed by: ANESTHESIOLOGY

## 2019-12-01 PROCEDURE — C1769 GUIDE WIRE: HCPCS | Performed by: UROLOGY

## 2019-12-01 PROCEDURE — 88300 SURGICAL PATH GROSS: CPT

## 2019-12-01 PROCEDURE — 99232 SBSQ HOSP IP/OBS MODERATE 35: CPT | Performed by: HOSPITALIST

## 2019-12-01 PROCEDURE — 160009 HCHG ANES TIME/MIN: Performed by: UROLOGY

## 2019-12-01 PROCEDURE — 500880 HCHG PACK, CYSTO W/SEP LEGGINGS: Performed by: UROLOGY

## 2019-12-01 PROCEDURE — 700102 HCHG RX REV CODE 250 W/ 637 OVERRIDE(OP): Performed by: INTERNAL MEDICINE

## 2019-12-01 PROCEDURE — 700101 HCHG RX REV CODE 250: Performed by: ANESTHESIOLOGY

## 2019-12-01 PROCEDURE — 700111 HCHG RX REV CODE 636 W/ 250 OVERRIDE (IP): Performed by: UROLOGY

## 2019-12-01 PROCEDURE — A9270 NON-COVERED ITEM OR SERVICE: HCPCS | Performed by: ANESTHESIOLOGY

## 2019-12-01 PROCEDURE — 0TP98DZ REMOVAL OF INTRALUMINAL DEVICE FROM URETER, VIA NATURAL OR ARTIFICIAL OPENING ENDOSCOPIC: ICD-10-PCS | Performed by: UROLOGY

## 2019-12-01 PROCEDURE — 700105 HCHG RX REV CODE 258: Performed by: UROLOGY

## 2019-12-01 PROCEDURE — 36415 COLL VENOUS BLD VENIPUNCTURE: CPT

## 2019-12-01 PROCEDURE — 501329 HCHG SET, CYSTO IRRIG Y TUR: Performed by: UROLOGY

## 2019-12-01 PROCEDURE — 700111 HCHG RX REV CODE 636 W/ 250 OVERRIDE (IP): Performed by: INTERNAL MEDICINE

## 2019-12-01 PROCEDURE — 80048 BASIC METABOLIC PNL TOTAL CA: CPT

## 2019-12-01 PROCEDURE — 0T778DZ DILATION OF LEFT URETER WITH INTRALUMINAL DEVICE, VIA NATURAL OR ARTIFICIAL OPENING ENDOSCOPIC: ICD-10-PCS | Performed by: UROLOGY

## 2019-12-01 PROCEDURE — 160002 HCHG RECOVERY MINUTES (STAT): Performed by: UROLOGY

## 2019-12-01 PROCEDURE — 87086 URINE CULTURE/COLONY COUNT: CPT

## 2019-12-01 PROCEDURE — 700105 HCHG RX REV CODE 258: Performed by: ANESTHESIOLOGY

## 2019-12-01 PROCEDURE — 160036 HCHG PACU - EA ADDL 30 MINS PHASE I: Performed by: UROLOGY

## 2019-12-01 PROCEDURE — 160041 HCHG SURGERY MINUTES - EA ADDL 1 MIN LEVEL 4: Performed by: UROLOGY

## 2019-12-01 PROCEDURE — 160035 HCHG PACU - 1ST 60 MINS PHASE I: Performed by: UROLOGY

## 2019-12-01 PROCEDURE — A9270 NON-COVERED ITEM OR SERVICE: HCPCS | Performed by: INTERNAL MEDICINE

## 2019-12-01 PROCEDURE — 700111 HCHG RX REV CODE 636 W/ 250 OVERRIDE (IP): Performed by: ANESTHESIOLOGY

## 2019-12-01 PROCEDURE — 82365 CALCULUS SPECTROSCOPY: CPT

## 2019-12-01 PROCEDURE — 160048 HCHG OR STATISTICAL LEVEL 1-5: Performed by: UROLOGY

## 2019-12-01 PROCEDURE — 160029 HCHG SURGERY MINUTES - 1ST 30 MINS LEVEL 4: Performed by: UROLOGY

## 2019-12-01 PROCEDURE — 85025 COMPLETE CBC W/AUTO DIFF WBC: CPT

## 2019-12-01 PROCEDURE — 770006 HCHG ROOM/CARE - MED/SURG/GYN SEMI*

## 2019-12-01 PROCEDURE — C2617 STENT, NON-COR, TEM W/O DEL: HCPCS | Performed by: UROLOGY

## 2019-12-01 PROCEDURE — 0TF78ZZ FRAGMENTATION IN LEFT URETER, VIA NATURAL OR ARTIFICIAL OPENING ENDOSCOPIC: ICD-10-PCS | Performed by: UROLOGY

## 2019-12-01 DEVICE — STENT UROLOGICAL POLARIS 6X24  ULTRA: Type: IMPLANTABLE DEVICE | Site: URETER | Status: FUNCTIONAL

## 2019-12-01 RX ORDER — SODIUM CHLORIDE, SODIUM LACTATE, POTASSIUM CHLORIDE, CALCIUM CHLORIDE 600; 310; 30; 20 MG/100ML; MG/100ML; MG/100ML; MG/100ML
INJECTION, SOLUTION INTRAVENOUS
Status: DISCONTINUED | OUTPATIENT
Start: 2019-12-01 | End: 2019-12-01 | Stop reason: SURG

## 2019-12-01 RX ORDER — HYDROMORPHONE HYDROCHLORIDE 1 MG/ML
0.1 INJECTION, SOLUTION INTRAMUSCULAR; INTRAVENOUS; SUBCUTANEOUS
Status: DISCONTINUED | OUTPATIENT
Start: 2019-12-01 | End: 2019-12-01 | Stop reason: HOSPADM

## 2019-12-01 RX ORDER — HALOPERIDOL 5 MG/ML
1 INJECTION INTRAMUSCULAR
Status: DISCONTINUED | OUTPATIENT
Start: 2019-12-01 | End: 2019-12-01 | Stop reason: HOSPADM

## 2019-12-01 RX ORDER — MEPERIDINE HYDROCHLORIDE 25 MG/ML
6.25 INJECTION INTRAMUSCULAR; INTRAVENOUS; SUBCUTANEOUS
Status: DISCONTINUED | OUTPATIENT
Start: 2019-12-01 | End: 2019-12-01 | Stop reason: HOSPADM

## 2019-12-01 RX ORDER — DIPHENHYDRAMINE HYDROCHLORIDE 50 MG/ML
12.5 INJECTION INTRAMUSCULAR; INTRAVENOUS
Status: DISCONTINUED | OUTPATIENT
Start: 2019-12-01 | End: 2019-12-01 | Stop reason: HOSPADM

## 2019-12-01 RX ORDER — HYDROMORPHONE HYDROCHLORIDE 1 MG/ML
0.2 INJECTION, SOLUTION INTRAMUSCULAR; INTRAVENOUS; SUBCUTANEOUS
Status: DISCONTINUED | OUTPATIENT
Start: 2019-12-01 | End: 2019-12-01 | Stop reason: HOSPADM

## 2019-12-01 RX ORDER — HYDROMORPHONE HYDROCHLORIDE 1 MG/ML
0.4 INJECTION, SOLUTION INTRAMUSCULAR; INTRAVENOUS; SUBCUTANEOUS
Status: DISCONTINUED | OUTPATIENT
Start: 2019-12-01 | End: 2019-12-01 | Stop reason: HOSPADM

## 2019-12-01 RX ORDER — ONDANSETRON 2 MG/ML
4 INJECTION INTRAMUSCULAR; INTRAVENOUS
Status: DISCONTINUED | OUTPATIENT
Start: 2019-12-01 | End: 2019-12-01 | Stop reason: HOSPADM

## 2019-12-01 RX ORDER — DEXAMETHASONE SODIUM PHOSPHATE 4 MG/ML
INJECTION, SOLUTION INTRA-ARTICULAR; INTRALESIONAL; INTRAMUSCULAR; INTRAVENOUS; SOFT TISSUE PRN
Status: DISCONTINUED | OUTPATIENT
Start: 2019-12-01 | End: 2019-12-01 | Stop reason: SURG

## 2019-12-01 RX ORDER — ONDANSETRON 2 MG/ML
INJECTION INTRAMUSCULAR; INTRAVENOUS PRN
Status: DISCONTINUED | OUTPATIENT
Start: 2019-12-01 | End: 2019-12-01 | Stop reason: SURG

## 2019-12-01 RX ORDER — OXYCODONE HCL 5 MG/5 ML
10 SOLUTION, ORAL ORAL
Status: COMPLETED | OUTPATIENT
Start: 2019-12-01 | End: 2019-12-01

## 2019-12-01 RX ORDER — SODIUM CHLORIDE, SODIUM LACTATE, POTASSIUM CHLORIDE, CALCIUM CHLORIDE 600; 310; 30; 20 MG/100ML; MG/100ML; MG/100ML; MG/100ML
INJECTION, SOLUTION INTRAVENOUS CONTINUOUS
Status: DISCONTINUED | OUTPATIENT
Start: 2019-12-01 | End: 2019-12-01 | Stop reason: HOSPADM

## 2019-12-01 RX ORDER — OXYCODONE HCL 5 MG/5 ML
5 SOLUTION, ORAL ORAL
Status: COMPLETED | OUTPATIENT
Start: 2019-12-01 | End: 2019-12-01

## 2019-12-01 RX ORDER — KETOROLAC TROMETHAMINE 30 MG/ML
INJECTION, SOLUTION INTRAMUSCULAR; INTRAVENOUS PRN
Status: DISCONTINUED | OUTPATIENT
Start: 2019-12-01 | End: 2019-12-01 | Stop reason: SURG

## 2019-12-01 RX ADMIN — ROCURONIUM BROMIDE 10 MG: 10 INJECTION, SOLUTION INTRAVENOUS at 13:07

## 2019-12-01 RX ADMIN — FENTANYL CITRATE 100 MCG: 50 INJECTION, SOLUTION INTRAMUSCULAR; INTRAVENOUS at 13:07

## 2019-12-01 RX ADMIN — HYDROMORPHONE HYDROCHLORIDE 1 MG: 1 INJECTION, SOLUTION INTRAMUSCULAR; INTRAVENOUS; SUBCUTANEOUS at 09:57

## 2019-12-01 RX ADMIN — ONDANSETRON 4 MG: 2 INJECTION INTRAMUSCULAR; INTRAVENOUS at 12:43

## 2019-12-01 RX ADMIN — DEXAMETHASONE SODIUM PHOSPHATE 4 MG: 4 INJECTION, SOLUTION INTRA-ARTICULAR; INTRALESIONAL; INTRAMUSCULAR; INTRAVENOUS; SOFT TISSUE at 12:43

## 2019-12-01 RX ADMIN — SENNOSIDES AND DOCUSATE SODIUM 2 TABLET: 8.6; 5 TABLET ORAL at 18:42

## 2019-12-01 RX ADMIN — EPHEDRINE SULFATE 10 MG: 50 INJECTION INTRAMUSCULAR; INTRAVENOUS; SUBCUTANEOUS at 13:07

## 2019-12-01 RX ADMIN — ROCURONIUM BROMIDE 5 MG: 10 INJECTION, SOLUTION INTRAVENOUS at 13:19

## 2019-12-01 RX ADMIN — CEFTRIAXONE SODIUM 2 G: 2 INJECTION, POWDER, FOR SOLUTION INTRAMUSCULAR; INTRAVENOUS at 10:25

## 2019-12-01 RX ADMIN — ROCURONIUM BROMIDE 5 MG: 10 INJECTION, SOLUTION INTRAVENOUS at 13:26

## 2019-12-01 RX ADMIN — FENTANYL CITRATE 150 MCG: 50 INJECTION, SOLUTION INTRAMUSCULAR; INTRAVENOUS at 12:34

## 2019-12-01 RX ADMIN — OXYCODONE HYDROCHLORIDE 10 MG: 5 SOLUTION ORAL at 14:28

## 2019-12-01 RX ADMIN — SODIUM CHLORIDE, POTASSIUM CHLORIDE, SODIUM LACTATE AND CALCIUM CHLORIDE: 600; 310; 30; 20 INJECTION, SOLUTION INTRAVENOUS at 12:26

## 2019-12-01 RX ADMIN — HYDROMORPHONE HYDROCHLORIDE 1 MG: 1 INJECTION, SOLUTION INTRAMUSCULAR; INTRAVENOUS; SUBCUTANEOUS at 03:25

## 2019-12-01 RX ADMIN — KETOROLAC TROMETHAMINE 30 MG: 30 INJECTION, SOLUTION INTRAMUSCULAR at 13:40

## 2019-12-01 RX ADMIN — PROPOFOL 200 MG: 10 INJECTION, EMULSION INTRAVENOUS at 12:34

## 2019-12-01 ASSESSMENT — LIFESTYLE VARIABLES
ON A TYPICAL DAY WHEN YOU DRINK ALCOHOL HOW MANY DRINKS DO YOU HAVE: 0
TOTAL SCORE: 0
HAVE PEOPLE ANNOYED YOU BY CRITICIZING YOUR DRINKING: NO
SUBSTANCE_ABUSE: 0
EVER HAD A DRINK FIRST THING IN THE MORNING TO STEADY YOUR NERVES TO GET RID OF A HANGOVER: NO
ALCOHOL_USE: NO
TOTAL SCORE: 0
CONSUMPTION TOTAL: NEGATIVE
TOTAL SCORE: 0
EVER FELT BAD OR GUILTY ABOUT YOUR DRINKING: NO
HAVE YOU EVER FELT YOU SHOULD CUT DOWN ON YOUR DRINKING: NO
AVERAGE NUMBER OF DAYS PER WEEK YOU HAVE A DRINK CONTAINING ALCOHOL: 0
HOW MANY TIMES IN THE PAST YEAR HAVE YOU HAD 5 OR MORE DRINKS IN A DAY: 0

## 2019-12-01 ASSESSMENT — PAIN SCALES - GENERAL: PAIN_LEVEL: 3

## 2019-12-01 ASSESSMENT — ENCOUNTER SYMPTOMS
ABDOMINAL PAIN: 0
HEADACHES: 0
DIZZINESS: 0
FOCAL WEAKNESS: 0
HEMOPTYSIS: 0
WHEEZING: 0
BRUISES/BLEEDS EASILY: 0
BACK PAIN: 0
LOSS OF CONSCIOUSNESS: 0
DIAPHORESIS: 0
CLAUDICATION: 0
SORE THROAT: 0
WEAKNESS: 0
FEVER: 0
DIARRHEA: 0
PALPITATIONS: 0
FLANK PAIN: 1
EYE PAIN: 0
COUGH: 0
SPEECH CHANGE: 0
NAUSEA: 0
CHILLS: 0
CONSTIPATION: 0
SPUTUM PRODUCTION: 0
EYE DISCHARGE: 0
VOMITING: 0
SENSORY CHANGE: 0
MYALGIAS: 0
NECK PAIN: 0
SHORTNESS OF BREATH: 0
DEPRESSION: 0

## 2019-12-01 NOTE — OR SURGEON
Immediate Post OP Note    PreOp Diagnosis: 10 mm left UPJ stone    PostOp Diagnosis: same as pre-op dx    Procedure(s):  CYSTOSCOPY, WITH URETERAL STENT INSERTION - Wound Class: Clean Contaminated  LITHOTRIPSY, USING LASER - Wound Class: Clean Contaminated    Surgeon(s):  Nikos Logan M.D.    Anesthesiologist/Type of Anesthesia:  Anesthesiologist: Hermes Boyd M.D./General    Surgical Staff:  Circulator: Sandra Hernández REDWARD; Rubina Garcia REDWARD  Relief Circulator: Sherlyn Ramos R.N.  Relief Scrub: Kvng Rodarte  Scrub Person: Susanna Montoya    Specimens removed if any:  ID Type Source Tests Collected by Time Destination   A :  Stone Ureter PATHOLOGY SPECIMEN Nikos Logan M.D. 12/1/2019 1310        Estimated Blood Loss: 0    Findings: 100 left UPJ stone and left ureteral stent    Complications: none        12/1/2019 1:57 PM Nikos Logan M.D.

## 2019-12-01 NOTE — PROGRESS NOTES
Alert and oriented x4. Diet reinstructed. Offered fluids and snacks. Safety precautions in placed. Bed in lowest position. Upper side rails up. Treaded socks on. Reinforced the use of call light when needing assistance.

## 2019-12-01 NOTE — ANESTHESIA PREPROCEDURE EVALUATION
Relevant Problems      (+) Hydronephrosis of left kidney     Vitals:    12/01/19 1139   BP: 121/83   Pulse: 68   Resp: 20   Temp: 36.3 °C (97.3 °F)   SpO2: 93%         Physical Exam    Airway   Mallampati: II  TM distance: >3 FB  Neck ROM: full       Cardiovascular - normal exam  Rhythm: regular  Rate: normal  (-) murmur     Dental - normal exam         Pulmonary - normal exam  Breath sounds clear to auscultation     Abdominal   (+) obese     Neurological - normal exam                 Anesthesia Plan    ASA 3   ASA physical status 3 criteria: morbid obesity - BMI greater than or equal to 40    Plan - general       Airway plan will be LMA        Induction: intravenous    Postoperative Plan: Postoperative administration of opioids is intended.    Pertinent diagnostic labs and testing reviewed    Informed Consent:    Anesthetic plan and risks discussed with patient.    Use of blood products discussed with: patient whom consented to blood products.

## 2019-12-01 NOTE — ANESTHESIA QCDR
2019 [facility] Clinical Data Registry (for Quality Improvement)     Postoperative nausea/vomiting risk protocol (Adult = 18 yrs and Pediatric 3-17 yrs)- (430 and 463)  General inhalation anesthetic (NOT TIVA) with PONV risk factors: Yes  Provision of anti-emetic therapy with at least 2 different classes of agents: Yes   Patient DID NOT receive anti-emetic therapy and reason is documented in Medical Record:  N/A    Multimodal Pain Management- (AQI59)  Patient undergoing Elective Surgery (i.e. Outpatient, or ASC, or Prescheduled Surgery prior to Hospital Admission): No  Use of Multimodal Pain Management, two or more drugs and/or interventions, NOT including systemic opioids: N/A  Exception: Documented allergy to multiple classes of analgesics: N/A    PACU assessment of acute postoperative pain prior to Anesthesia Care End- Applies to Patients Age = 18- (ABG7)  Initial PACU pain score is which of the following: < 7/10  Patient unable to report pain score: N/A    Post-anesthetic transfer of care checklist/protocol to PACU/ICU- (426 and 427)  Upon conclusion of case, patient transferred to which of the following locations: PACU/Non-ICU  Use of transfer checklist/protocol: Yes  Exclusion: Service Performed in Patient Hospital Room (and thus did not require transfer): N/A    PACU Reintubation- (AQI31)  General anesthesia requiring endotracheal intubation (ETT) along with subsequent extubation in OR or PACU: No  Required reintubation in the PACU: N/A  Extubation was a planned trial documented in the medical record prior to removal of the original airway device: N/A    Unplanned admission to ICU related to anesthesia service up through end of PACU care- (MD51)  Unplanned admission to ICU (not initially anticipated at anesthesia start time): No

## 2019-12-01 NOTE — PROGRESS NOTES
[facility] Medicine Daily Progress Note    Date of Service  12/1/2019    Chief Complaint  49 y.o. male admitted 11/30/2019 with worsening left flank pain.    Hospital Course    12/1:  This 49-year-old male with a previously diagnosed 1125 1910 mm left UPJ stone who had cystoscopy with Dr. Lynda ANDERSON and stent placement.  Dr. Bobby Ball was unable to perform lithotripsy on the large stone due to a narrow ureter.  The patient at home continue to have worsening left flank pain to the point that he presented to the emergency room.  CT again shows large 10 mm left UPJ stone.  Urology Dr. Logan was consulted.  IR was consulted however no significant hydronephrosis noted therefore no external stent placed.  Urine culture negative from 11/27/2019.  Normal renal function.  Patient is having normal urine output.  N.p.o. today for cystoscopy and lithotripsy by Dr. Logan.*        Consultants/Specialty  Dr. Logan, urology    Code Status  full    Disposition  Home once cleared by urology.    Review of Systems  Review of Systems   Constitutional: Negative for chills, diaphoresis, fever and malaise/fatigue.   HENT: Negative for congestion and sore throat.    Eyes: Negative for pain and discharge.   Respiratory: Negative for cough, hemoptysis, sputum production, shortness of breath and wheezing.    Cardiovascular: Negative for chest pain, palpitations, claudication and leg swelling.   Gastrointestinal: Negative for abdominal pain, constipation, diarrhea, melena, nausea and vomiting.   Genitourinary: Positive for flank pain (left sided). Negative for dysuria, frequency and urgency.   Musculoskeletal: Negative for back pain, joint pain, myalgias and neck pain.   Skin: Negative for itching and rash.   Neurological: Negative for dizziness, sensory change, speech change, focal weakness, loss of consciousness, weakness and headaches.   Endo/Heme/Allergies: Does not bruise/bleed easily.   Psychiatric/Behavioral: Negative for depression,  substance abuse and suicidal ideas.        Physical Exam  Temp:  [36.2 °C (97.2 °F)-37 °C (98.6 °F)] 36.4 °C (97.6 °F)  Pulse:  [] 66  Resp:  [10-20] 12  BP: (108-159)/(74-95) 154/94  SpO2:  [92 %-100 %] 95 %    Physical Exam  Constitutional:       General: He is not in acute distress.     Appearance: He is not diaphoretic.   HENT:      Head: Normocephalic and atraumatic.      Mouth/Throat:      Pharynx: No oropharyngeal exudate.   Eyes:      General: No scleral icterus.        Right eye: No discharge.         Left eye: No discharge.      Conjunctiva/sclera: Conjunctivae normal.      Pupils: Pupils are equal, round, and reactive to light.   Neck:      Musculoskeletal: Normal range of motion and neck supple.      Thyroid: No thyromegaly.      Vascular: No JVD.      Trachea: No tracheal deviation.   Cardiovascular:      Rate and Rhythm: Normal rate and regular rhythm.      Heart sounds: Normal heart sounds. No murmur. No friction rub. No gallop.    Pulmonary:      Effort: Pulmonary effort is normal. No respiratory distress.      Breath sounds: Normal breath sounds. No wheezing or rales.   Chest:      Chest wall: No tenderness.   Abdominal:      General: Bowel sounds are normal. There is no distension.      Palpations: Abdomen is soft. There is no mass.      Tenderness: There is tenderness. There is left CVA tenderness. There is no guarding or rebound.   Musculoskeletal: Normal range of motion.         General: No tenderness.   Lymphadenopathy:      Cervical: No cervical adenopathy.   Skin:     General: Skin is warm and dry.      Findings: No erythema or rash.   Neurological:      Mental Status: He is alert and oriented to person, place, and time.      Cranial Nerves: No cranial nerve deficit.      Motor: No abnormal muscle tone.   Psychiatric:         Behavior: Behavior normal.         Thought Content: Thought content normal.         Judgment: Judgment normal.         Fluids    Intake/Output Summary (Last 24  hours) at 12/1/2019 1450  Last data filed at 12/1/2019 1351  Gross per 24 hour   Intake 700 ml   Output --   Net 700 ml       Laboratory  Recent Labs     11/30/19  1328 12/01/19  0205   WBC 12.3* 9.2   RBC 5.19 4.50*   HEMOGLOBIN 15.7 13.9*   HEMATOCRIT 46.0 41.0*   MCV 88.6 91.1   MCH 30.3 30.9   MCHC 34.1 33.9   RDW 41.7 44.5   PLATELETCT 290 290   MPV 9.4 9.5     Recent Labs     11/30/19  1328 12/01/19  0205   SODIUM 140 140   POTASSIUM 4.4 3.9   CHLORIDE 102 109   CO2 23 23   GLUCOSE 143* 101*   BUN 18 15   CREATININE 0.94 0.94   CALCIUM 9.9 8.6                   Imaging  IR-NEPHROSTOGRAM W/ NEW TUBE PLACEMENT (ALL RADIOLOGY) LEFT    (Results Pending)   DX-CYSTO FLUORO > 1 HOUR    (Results Pending)        Assessment/Plan  Hyperglycemia- (present on admission)  Assessment & Plan  Check hemoglobin A1c.    Left ureteral stone- (present on admission)  Assessment & Plan  10 mm left UPJ stone.  11/25/2019 unable to perform lithotripsy by Dr. Bobby Ball.  12/1/2019 repeat attempt by Dr. Logan for cystoscopy and lithotripsy of large stone  Urine culture from 11/27/2019-.  Normal renal function.  IV pain control.       VTE prophylaxis: scds

## 2019-12-01 NOTE — ANESTHESIA POSTPROCEDURE EVALUATION
Patient: Jae Brandon    Procedure Summary     Date:  12/01/19 Room / Location:  [place] [address] / SURGERY [facility]    Anesthesia Start:  1226 Anesthesia Stop:  1351    Procedures:       CYSTOSCOPY, WITH URETERAL STENT INSERTION (Left )      LITHOTRIPSY, USING LASER (Ureter) Diagnosis:  (stent pain )    Surgeon:  Nikos Logan M.D. Responsible Provider:  Hermes Boyd M.D.    Anesthesia Type:  general ASA Status:  3          Final Anesthesia Type: general  Last vitals  BP   Blood Pressure: 121/83    Temp   36.3 °C (97.3 °F)    Pulse   Pulse: 68   Resp   20    SpO2   93 %      Anesthesia Post Evaluation    Patient location during evaluation: PACU  Patient participation: complete - patient participated  Level of consciousness: awake and alert  Pain score: 3    Airway patency: patent  Anesthetic complications: no  Cardiovascular status: hemodynamically stable  Respiratory status: acceptable  Hydration status: euvolemic    PONV: none           Nurse Pain Score: 5 (NPRS)

## 2019-12-01 NOTE — OP REPORT
DATE OF SERVICE:  12/01/2019    PREOPERATIVE DIAGNOSIS:  A 1 cm stone at the left UPJ with an indwelling left   double-J ureteral stent.    POSTOPERATIVE DIAGNOSIS:  A 1 cm stone at the left junction with an indwelling   left double-J ureteral stent.    PROCEDURES PERFORMED:  1.  Cystoscopy.  2.  Removal of left double-J ureteral stent.  3.  Left ureteroscopy.  4.  Laser lithotripsy in the ureter and kidney.  5.  Ureteral stent placement.    PROCEDURE IN DETAIL:  The patient was brought in the operating room and   transferred from the hospital bed to the OR table.  He was then given an   excellent general anesthetic by Dr. Hermes Boyd.  He was then placed into the   dorsal lithotomy position.  He was prepped and draped in the usual fashion.    Time-out was confirmed for patient's identification, procedure to be   performed, site of procedure, reviewed patient's allergies, and review   preoperative antibiotics given.  Once time-out was completed and agreed upon,   the case was started.  A 22-Mohawk rigid cystoscope was white balanced,   lubricated, and then passed through the urethra.  The anterior urethra was   without stricture or lesion.  The external sphincter was competent.  Prostatic   urethra shows lateral lobe hyperplasia.  Prostatic urethral length was 4 cm.    Bladder was entered.  There was a stent protruding from the left ureteral   orifice.  It was grasped with an alligator grasper and then removed.  Prior to   its removal, a 0.035 sensor guidewire was passed up alongside the stent and   up the left ureter.  As the stent was removed entirely, the guidewire was left   in the left ureter.  The cystoscope and the stent are then removed.  I passed   a ureteral access catheter over the guidewire first using its internal   obturator and this did pass up the ureter.  I then removed that and placed it   within its access sheath.  With a little bit of pressure, I was able to get   that up the left ureter.  Once  in left ureter, the obturator and guidewire   removed.  Using the digital disposable flexible ureteroscope, I then passed   this up the access sheath into the upper left ureter.  The stone was   encountered near the UPJ.  It was lodged in the ureter.  I passed a holmium   laser fiber 200 micron.  Laser settings initially were 0.5 joules and 30 Hz.    This really did not break the stone very well, so I decreased the hertz to 10   and increased the power to 1 and later 1.5 joules.  This did break it up the   stone into smaller fragments.  I tried basketing a few of these out, but I was   unsuccessful because they were too large.  I then chased these fragments back   into the kidney and spent the next hour going through every sharonda of the   kidney and breaking the stone into small fragments that would be able to pass   down the ureter.  After I was satisfied that the stone had been well fractured   and that the pieces would be able to pass down the ureter, I replaced the   guidewire in the ureter via the scope.  I then removed the scope and the   access sheath observing the entire length of the ureter.  No ureteral injuries   are noted.  I then back fed the guidewire over the 22-Turkish rigid   cystoscope, passed this into the urinary bladder.  I passed a 6-Turkish 24 cm   length double-J stent over the guidewire pushing up to the level of the renal   pelvis, where the guidewire was pulled back, allowing the upper curl of the   stent to engage within the left renal pelvis.  The guidewire was removed   completely and the lower curl of the stent engages within the urinary bladder.    A string was left on this so it can be removed in the office.  Prep was then   cleaned off the patient.  The string coming out of the urethra was taped to   the patient's penis.  He was then taken out of lithotomy position after all   the prep was cleaned off.  He was transferred back to his hospital bed on a   flotation device because he is a  large man.  He was then returned to recovery   in stable condition.    ESTIMATED BLOOD LOSS:  Zero.    DRAINS:  Left double-J ureteral stent.    SPECIMEN:  Stone fragments.    COMPLICATIONS:  None.       ____________________________________     MD ERIK Florez / ROCHELLE    DD:  12/01/2019 14:06:52  DT:  12/01/2019 14:45:14    D#:  [id number]  Job#:  [id number]

## 2019-12-01 NOTE — PROGRESS NOTES
Arrived from [facility] for needed IR nephrostomy tube placement per urology recs.    Stable in room, orders in place.

## 2019-12-01 NOTE — ANESTHESIA PROCEDURE NOTES
Airway  Date/Time: 12/1/2019 12:36 PM  Performed by: Hermes Boyd M.D.  Authorized by: Hermes Boyd M.D.     Location:  OR  Urgency:  Elective  Indications for Airway Management:  Anesthesia  Spontaneous Ventilation: absent    Sedation Level:  Deep  Preoxygenated: Yes    Mask Difficulty Assessment:  0 - not attempted  Final Airway Type:  Supraglottic airway  Final Supraglottic Airway:  Standard LMA  SGA Size:  5  Number of Attempts at Approach:  1

## 2019-12-01 NOTE — ASSESSMENT & PLAN NOTE
10 mm left UPJ stone.  11/25/2019 unable to perform lithotripsy by Dr. Bobby Ball.  12/1/2019 repeat attempt by Dr. Logan for cystoscopy and lithotripsy of large stone  Urine culture from 11/27/2019-.  Normal renal function.  IV pain control.

## 2019-12-01 NOTE — CARE PLAN
Problem: Communication  Goal: The ability to communicate needs accurately and effectively will improve  Outcome: PROGRESSING AS EXPECTED     Problem: Bowel/Gastric:  Goal: Normal bowel function is maintained or improved  Outcome: PROGRESSING AS EXPECTED  Goal: Will not experience complications related to bowel motility  Outcome: PROGRESSING AS EXPECTED     Problem: Knowledge Deficit  Goal: Knowledge of disease process/condition, treatment plan, diagnostic tests, and medications will improve  Outcome: PROGRESSING AS EXPECTED  Goal: Knowledge of the prescribed therapeutic regimen will improve  Outcome: PROGRESSING AS EXPECTED

## 2019-12-01 NOTE — ANESTHESIA TIME REPORT
Anesthesia Start and Stop Event Times     Date Time Event    12/1/2019 1213 Ready for Procedure     1226 Anesthesia Start     1351 Anesthesia Stop        Responsible Staff  12/01/19    Name Role Begin End    Hermes Boyd M.D. Anesth 1226 1351        Preop Diagnosis (Free Text):  Pre-op Diagnosis     stent pain        Preop Diagnosis (Codes):    Post op Diagnosis  Ureteral stone      Premium Reason  E. Weekend    Comments:

## 2019-12-01 NOTE — PROGRESS NOTES
IR Procedure Note:    Dr. Rocha consented patient prior to procedure; all questions answered.    Site confirmed with imaging guidance by patient, physician, RT, and RN.     Dr. Rocha completed an ultrasound exam of left kidney - no significant hydronephrosis noted. No nephrostomy tube placement attempted. The patient tolerated the procedure well; ETCo2 range 29-33, with consistent waveform during the procedure.      Patient alert, oriented and verbally appropriate post procedure, patient remained hemodynamically stable during procedure and transport, see flow sheet for vital signs.  Report given to DONTE Gonzalez.  RN transported patient to S616 with SaO2 monitor @ 96 % on room air.     Procedure End Time: 1834

## 2019-12-01 NOTE — CONSULTS
UROLOGY Consult Note:    Nikos Logan  Date & Time note created:    12/1/2019   9:39 AM     Referring MD:  Dr. Harrington    Patient ID:   Name:             Jae Brandon   YOB: 1970  Age:                 49 y.o.  male   MRN:               [id number]                                                             Reason for Consult:      Stent pain    History of Present Illness:    10 mm left UPJ stone. Had a stent placed by Dr Dannie Ball. Unable to treat stone due to a narrow ureter. Stent causing severe pain so the patient was admitted for pian control.    Review of Systems:      Constitutional: Denies fevers, Denies weight changes  Eyes: Denies changes in vision, no eye pain  Ears/Nose/Throat/Mouth: Denies nasal congestion or sore throat   Cardiovascular: no chest pain, no palpitations   Respiratory: no shortness of breath , Denies cough  Gastrointestinal/Hepatic: Denies abdominal pain, nausea, vomiting, diarrhea, constipation or GI bleeding   Genitourinary: Denies hematuria, dysuria or frequency  Musculoskeletal/Rheum: Denies  joint pain and swelling, no edema  Skin: Denies rash  Neurological: Denies headache, confusion, memory loss or focal weakness/parasthesias  Psychiatric: denies mood disorder   Endocrine: Thelma thyroid problems  Heme/Oncology/Lymph Nodes: Denies enlarged lymph nodes, denies brusing or known bleeding disorder  All other systems were reviewed and are negative (AMA/CMS criteria)                Past Medical History:   Past Medical History:   Diagnosis Date   • Renal disorder      There are no active hospital problems to display for this patient.      Past Surgical History:  Past Surgical History:   Procedure Laterality Date   • CYSTOSCOPY Left 11/25/2019    Procedure: CYSTOSCOPY;  Surgeon: Donovan Whitehead M.D.;  Location: SURGERY [facility];  Service: Urology   • URETEROSCOPY Left 11/25/2019    Procedure: URETEROSCOPY;  Surgeon: Donovan Whitehead M.D.;   Location: SURGERY [facility];  Service: Urology   • LASERTRIPSY Left 11/25/2019    Procedure: LITHOTRIPSY, USING LASER;  Surgeon: Donovan Whitehead M.D.;  Location: [facility];  Service: Urology   • STENT PLACEMENT Left 11/25/2019    Procedure: STENT PLACEMENT;  Surgeon: Donovan Whitehead M.D.;  Location: [facility];  Service: Urology       Hospital Medications:    Current Facility-Administered Medications:   •  NS infusion, , Intravenous, Continuous, Monisha Thompson M.D., Last Rate: 150 mL/hr at 11/30/19 1625  •  enalaprilat (VASOTEC) injection 1.25 mg, 1.25 mg, Intravenous, Q6HRS PRN, Crystal Mai M.D.  •  ondansetron (ZOFRAN ODT) dispertab 4 mg, 4 mg, Oral, Q4HRS PRN, Crystal Mai M.D.  •  ondansetron (ZOFRAN) syringe/vial injection 4 mg, 4 mg, Intravenous, Q4HRS PRN, Crystal Mai M.D.  •  prochlorperazine (COMPAZINE) injection 5-10 mg, 5-10 mg, Intravenous, Q4HRS PRN, Crystal Mai M.D.  •  promethazine (PHENERGAN) suppository 12.5-25 mg, 12.5-25 mg, Rectal, Q4HRS PRN, Crystal Mai M.D.  •  promethazine (PHENERGAN) tablet 12.5-25 mg, 12.5-25 mg, Oral, Q4HRS PRN, Crystal Mai M.D.  •  senna-docusate (PERICOLACE or SENOKOT S) 8.6-50 MG per tablet 2 Tab, 2 Tab, Oral, BID, Stopped at 11/30/19 1800 **AND** polyethylene glycol/lytes (MIRALAX) PACKET 1 Packet, 1 Packet, Oral, QDAY PRN **AND** magnesium hydroxide (MILK OF MAGNESIA) suspension 30 mL, 30 mL, Oral, QDAY PRN **AND** bisacodyl (DULCOLAX) suppository 10 mg, 10 mg, Rectal, QDAY PRN, Crystal Mai M.D.  •  HYDROmorphone pf (DILAUDID) injection 1 mg, 1 mg, Intravenous, Q3HRS PRN, Crystal Mai M.D., 1 mg at 12/01/19 0325  •  ketorolac (TORADOL) injection 15 mg, 15 mg, Intravenous, Q6HRS PRN, Crystal Mai M.D.  •  oxyCODONE-acetaminophen (PERCOCET) 5-325 MG per tablet 1 Tab, 1 Tab, Oral, Q4HRS PRN, Crystal Mai M.D.    Current Outpatient Medications:  Medications Prior to Admission    Medication Sig Dispense Refill Last Dose   • acetaminophen (TYLENOL) 500 MG Tab Take 500-1,000 mg by mouth every 6 hours as needed.   11/30/2019 at 0630       Medication Allergy:  No Known Allergies    Family History:  History reviewed. No pertinent family history.    Social History:  Social History     Socioeconomic History   • Marital status:      Spouse name: Not on file   • Number of children: Not on file   • Years of education: Not on file   • Highest education level: Not on file   Occupational History   • Not on file   Social Needs   • Financial resource strain: Not on file   • Food insecurity:     Worry: Not on file     Inability: Not on file   • Transportation needs:     Medical: Not on file     Non-medical: Not on file   Tobacco Use   • Smoking status: Never Smoker   • Smokeless tobacco: Never Used   Substance and Sexual Activity   • Alcohol use: Not Currently   • Drug use: Not Currently   • Sexual activity: Not on file   Lifestyle   • Physical activity:     Days per week: Not on file     Minutes per session: Not on file   • Stress: Not on file   Relationships   • Social connections:     Talks on phone: Not on file     Gets together: Not on file     Attends Confucianism service: Not on file     Active member of club or organization: Not on file     Attends meetings of clubs or organizations: Not on file     Relationship status: Not on file   • Intimate partner violence:     Fear of current or ex partner: Not on file     Emotionally abused: Not on file     Physically abused: Not on file     Forced sexual activity: Not on file   Other Topics Concern   • Not on file   Social History Narrative   • Not on file         Physical Exam:  Vitals/ General Appearance:   Weight/BMI: There is no height or weight on file to calculate BMI.  /77   Pulse 71   Temp 36.2 °C (97.2 °F) (Temporal)   Resp 19   SpO2 92%   Vitals:    11/30/19 2015 11/30/19 2041 12/01/19 0320 12/01/19 0735   BP: 147/91 149/94 108/75  127/77   Pulse: 83 81 67 71   Resp: 18 20 16 19   Temp:   36.5 °C (97.7 °F) 36.2 °C (97.2 °F)   TempSrc:   Temporal Temporal   SpO2:         Oxygen Therapy:  Pulse Oximetry: 92 %, O2 (LPM): 0, O2 Delivery: None (Room Air)    Constitutional:   Obese, Well developed, Well nourished, No acute distress  HENMT:  Normocephalic, Atraumatic, Oropharynx moist mucous membranes, No oral exudates, Nose normal.  No thyromegaly.  Eyes:  EOMI, Conjunctiva normal, No discharge.  Neck:  Normal range of motion, No cervical tenderness,  no JVD.  Cardiovascular:  Normal heart rate, Normal rhythm, No murmurs, No rubs, No gallops.   Extremitites with intact distal pulses, no cyanosis, or edema.  Lungs:  Normal breath sounds, breath sounds clear to auscultation bilaterally,  no crackles, no wheezing.   Abdomen: Bowel sounds normal, Soft, No tenderness, No guarding, No rebound, No masses, No hepatosplenomegaly.  : Deferred  Skin: Warm, Dry, No erythema, No rash, no induration.  Neurologic: Alert & oriented x 3, No focal deficits noted, cranial nerves II through X are grossly intact.  Psychiatric: Affect normal, Judgment normal, Mood normal.      MDM (Data Review):     Records reviewed and summarized in current documentation    Lab Data Review:  Recent Results (from the past 24 hour(s))   URINALYSIS,CULTURE IF INDICATED    Collection Time: 11/30/19  1:18 PM   Result Value Ref Range    Color Brown     Character Cloudy (A)     Specific Gravity 1.025 <1.035    Micro Urine Req Microscopic    URINE MICROSCOPIC (W/UA)    Collection Time: 11/30/19  1:18 PM   Result Value Ref Range    WBC 2-5 (A) /hpf    RBC >150 (A) /hpf   CBC WITH DIFFERENTIAL    Collection Time: 11/30/19  1:28 PM   Result Value Ref Range    WBC 12.3 (H) 4.8 - 10.8 K/uL    RBC 5.19 4.70 - 6.10 M/uL    Hemoglobin 15.7 14.0 - 18.0 g/dL    Hematocrit 46.0 42.0 - 52.0 %    MCV 88.6 81.4 - 97.8 fL    MCH 30.3 27.0 - 33.0 pg    MCHC 34.1 33.7 - 35.3 g/dL    RDW 41.7 35.9 - 50.0 fL     Platelet Count 290 164 - 446 K/uL    MPV 9.4 9.0 - 12.9 fL    Neutrophils-Polys 81.90 (H) 44.00 - 72.00 %    Lymphocytes 10.80 (L) 22.00 - 41.00 %    Monocytes 5.50 0.00 - 13.40 %    Eosinophils 0.70 0.00 - 6.90 %    Basophils 0.70 0.00 - 1.80 %    Immature Granulocytes 0.40 0.00 - 0.90 %    Nucleated RBC 0.00 /100 WBC    Neutrophils (Absolute) 10.07 (H) 1.82 - 7.42 K/uL    Lymphs (Absolute) 1.33 1.00 - 4.80 K/uL    Monos (Absolute) 0.67 0.00 - 0.85 K/uL    Eos (Absolute) 0.08 0.00 - 0.51 K/uL    Baso (Absolute) 0.08 0.00 - 0.12 K/uL    Immature Granulocytes (abs) 0.05 0.00 - 0.11 K/uL    NRBC (Absolute) 0.00 K/uL   COMP METABOLIC PANEL    Collection Time: 11/30/19  1:28 PM   Result Value Ref Range    Sodium 140 135 - 145 mmol/L    Potassium 4.4 3.6 - 5.5 mmol/L    Chloride 102 96 - 112 mmol/L    Co2 23 20 - 33 mmol/L    Anion Gap 15.0 (H) 0.0 - 11.9    Glucose 143 (H) 65 - 99 mg/dL    Bun 18 8 - 22 mg/dL    Creatinine 0.94 0.50 - 1.40 mg/dL    Calcium 9.9 8.4 - 10.2 mg/dL    AST(SGOT) 19 12 - 45 U/L    ALT(SGPT) 30 2 - 50 U/L    Alkaline Phosphatase 85 30 - 99 U/L    Total Bilirubin 0.3 0.1 - 1.5 mg/dL    Albumin 4.5 3.2 - 4.9 g/dL    Total Protein 7.4 6.0 - 8.2 g/dL    Globulin 2.9 1.9 - 3.5 g/dL    A-G Ratio 1.6 g/dL   LIPASE    Collection Time: 11/30/19  1:28 PM   Result Value Ref Range    Lipase 26 7 - 58 U/L   ESTIMATED GFR    Collection Time: 11/30/19  1:28 PM   Result Value Ref Range    GFR If African American >60 >60 mL/min/1.73 m 2    GFR If Non African American >60 >60 mL/min/1.73 m 2   Basic Metabolic Panel (BMP)    Collection Time: 12/01/19  2:05 AM   Result Value Ref Range    Sodium 140 135 - 145 mmol/L    Potassium 3.9 3.6 - 5.5 mmol/L    Chloride 109 96 - 112 mmol/L    Co2 23 20 - 33 mmol/L    Glucose 101 (H) 65 - 99 mg/dL    Bun 15 8 - 22 mg/dL    Creatinine 0.94 0.50 - 1.40 mg/dL    Calcium 8.6 8.5 - 10.5 mg/dL    Anion Gap 8.0 0.0 - 11.9   CBC with Differential    Collection Time: 12/01/19  2:05  AM   Result Value Ref Range    WBC 9.2 4.8 - 10.8 K/uL    RBC 4.50 (L) 4.70 - 6.10 M/uL    Hemoglobin 13.9 (L) 14.0 - 18.0 g/dL    Hematocrit 41.0 (L) 42.0 - 52.0 %    MCV 91.1 81.4 - 97.8 fL    MCH 30.9 27.0 - 33.0 pg    MCHC 33.9 33.7 - 35.3 g/dL    RDW 44.5 35.9 - 50.0 fL    Platelet Count 290 164 - 446 K/uL    MPV 9.5 9.0 - 12.9 fL    Neutrophils-Polys 55.70 44.00 - 72.00 %    Lymphocytes 30.60 22.00 - 41.00 %    Monocytes 11.10 0.00 - 13.40 %    Eosinophils 2.00 0.00 - 6.90 %    Basophils 0.50 0.00 - 1.80 %    Immature Granulocytes 0.10 0.00 - 0.90 %    Nucleated RBC 0.00 /100 WBC    Neutrophils (Absolute) 5.11 1.82 - 7.42 K/uL    Lymphs (Absolute) 2.81 1.00 - 4.80 K/uL    Monos (Absolute) 1.02 (H) 0.00 - 0.85 K/uL    Eos (Absolute) 0.18 0.00 - 0.51 K/uL    Baso (Absolute) 0.05 0.00 - 0.12 K/uL    Immature Granulocytes (abs) 0.01 0.00 - 0.11 K/uL    NRBC (Absolute) 0.00 K/uL   ESTIMATED GFR    Collection Time: 12/01/19  2:05 AM   Result Value Ref Range    GFR If African American >60 >60 mL/min/1.73 m 2    GFR If Non African American >60 >60 mL/min/1.73 m 2       Imaging/Procedures Review:    Reviewed    MDM (Assessment and Plan):     The patient wants his stone treated today. He was scheduled to have surgery this coming Wednesday.  Plan  Left CULTS

## 2019-12-02 VITALS
DIASTOLIC BLOOD PRESSURE: 95 MMHG | RESPIRATION RATE: 19 BRPM | OXYGEN SATURATION: 93 % | TEMPERATURE: 97.7 F | SYSTOLIC BLOOD PRESSURE: 140 MMHG | HEART RATE: 65 BPM

## 2019-12-02 PROBLEM — Z98.890 STATUS POST LASER LITHOTRIPSY OF URETERAL CALCULUS: Status: ACTIVE | Noted: 2019-12-02

## 2019-12-02 PROBLEM — Z46.6 ENCOUNTER FOR ADJUSTMENT OF URETERAL STENT: Status: ACTIVE | Noted: 2019-12-02

## 2019-12-02 LAB
ANION GAP SERPL CALC-SCNC: 8 MMOL/L (ref 0–11.9)
BASOPHILS # BLD AUTO: 0.3 % (ref 0–1.8)
BASOPHILS # BLD: 0.03 K/UL (ref 0–0.12)
BUN SERPL-MCNC: 18 MG/DL (ref 8–22)
CALCIUM SERPL-MCNC: 8.6 MG/DL (ref 8.5–10.5)
CHLORIDE SERPL-SCNC: 108 MMOL/L (ref 96–112)
CO2 SERPL-SCNC: 22 MMOL/L (ref 20–33)
CREAT SERPL-MCNC: 0.94 MG/DL (ref 0.5–1.4)
EOSINOPHIL # BLD AUTO: 0.08 K/UL (ref 0–0.51)
EOSINOPHIL NFR BLD: 0.8 % (ref 0–6.9)
ERYTHROCYTE [DISTWIDTH] IN BLOOD BY AUTOMATED COUNT: 45.2 FL (ref 35.9–50)
EST. AVERAGE GLUCOSE BLD GHB EST-MCNC: 120 MG/DL
GLUCOSE SERPL-MCNC: 117 MG/DL (ref 65–99)
HBA1C MFR BLD: 5.8 % (ref 0–5.6)
HCT VFR BLD AUTO: 40.7 % (ref 42–52)
HGB BLD-MCNC: 13.6 G/DL (ref 14–18)
IMM GRANULOCYTES # BLD AUTO: 0.04 K/UL (ref 0–0.11)
IMM GRANULOCYTES NFR BLD AUTO: 0.4 % (ref 0–0.9)
LYMPHOCYTES # BLD AUTO: 2.13 K/UL (ref 1–4.8)
LYMPHOCYTES NFR BLD: 20.6 % (ref 22–41)
MCH RBC QN AUTO: 31.1 PG (ref 27–33)
MCHC RBC AUTO-ENTMCNC: 33.4 G/DL (ref 33.7–35.3)
MCV RBC AUTO: 92.9 FL (ref 81.4–97.8)
MONOCYTES # BLD AUTO: 1.17 K/UL (ref 0–0.85)
MONOCYTES NFR BLD AUTO: 11.3 % (ref 0–13.4)
NEUTROPHILS # BLD AUTO: 6.88 K/UL (ref 1.82–7.42)
NEUTROPHILS NFR BLD: 66.6 % (ref 44–72)
NRBC # BLD AUTO: 0 K/UL
NRBC BLD-RTO: 0 /100 WBC
PATHOLOGY CONSULT NOTE: NORMAL
PLATELET # BLD AUTO: 272 K/UL (ref 164–446)
PMV BLD AUTO: 9.6 FL (ref 9–12.9)
POTASSIUM SERPL-SCNC: 4 MMOL/L (ref 3.6–5.5)
RBC # BLD AUTO: 4.38 M/UL (ref 4.7–6.1)
SODIUM SERPL-SCNC: 138 MMOL/L (ref 135–145)
WBC # BLD AUTO: 10.3 K/UL (ref 4.8–10.8)

## 2019-12-02 PROCEDURE — 99239 HOSP IP/OBS DSCHRG MGMT >30: CPT | Performed by: HOSPITALIST

## 2019-12-02 PROCEDURE — 700105 HCHG RX REV CODE 258: Performed by: HOSPITALIST

## 2019-12-02 PROCEDURE — 85025 COMPLETE CBC W/AUTO DIFF WBC: CPT

## 2019-12-02 PROCEDURE — 700105 HCHG RX REV CODE 258: Performed by: UROLOGY

## 2019-12-02 PROCEDURE — 700111 HCHG RX REV CODE 636 W/ 250 OVERRIDE (IP): Performed by: UROLOGY

## 2019-12-02 PROCEDURE — 36415 COLL VENOUS BLD VENIPUNCTURE: CPT

## 2019-12-02 PROCEDURE — 83036 HEMOGLOBIN GLYCOSYLATED A1C: CPT

## 2019-12-02 PROCEDURE — 80048 BASIC METABOLIC PNL TOTAL CA: CPT

## 2019-12-02 RX ORDER — NICOTINE 14MG/24HR
1 PATCH, TRANSDERMAL 24 HOURS TRANSDERMAL
Qty: 60 CAP | Refills: 0 | Status: SHIPPED | OUTPATIENT
Start: 2019-12-02

## 2019-12-02 RX ORDER — OXYCODONE HYDROCHLORIDE AND ACETAMINOPHEN 5; 325 MG/1; MG/1
1-2 TABLET ORAL EVERY 6 HOURS PRN
Qty: 20 TAB | Refills: 0 | Status: SHIPPED | OUTPATIENT
Start: 2019-12-02 | End: 2019-12-07

## 2019-12-02 RX ORDER — AMOXICILLIN 250 MG
2 CAPSULE ORAL DAILY
Qty: 60 TAB | Refills: 0 | Status: SHIPPED | OUTPATIENT
Start: 2019-12-02

## 2019-12-02 RX ADMIN — SODIUM CHLORIDE: 900 INJECTION INTRAVENOUS at 03:05

## 2019-12-02 RX ADMIN — CEFTRIAXONE SODIUM 2 G: 2 INJECTION, POWDER, FOR SOLUTION INTRAMUSCULAR; INTRAVENOUS at 05:25

## 2019-12-02 ASSESSMENT — ENCOUNTER SYMPTOMS
VOMITING: 0
FEVER: 0
FLANK PAIN: 0
NAUSEA: 0
CHILLS: 0

## 2019-12-02 NOTE — CARE PLAN
Problem: Communication  Goal: The ability to communicate needs accurately and effectively will improve  Outcome: MET   Patient communicates needs effectively. Discharging today.     Problem: Safety  Goal: Will remain free from injury  Outcome: MET    Patient discharging today and is free from injury.

## 2019-12-02 NOTE — PROGRESS NOTES
Received report and assumed care of patient (pt) at change of shift. Pt is A&Ox4. During change of shift report pt was sleeping. Pt is discharging today once cleared by urologist. Pt does not complain of pain at this time. Safety precautions in place and all needs met at this time.

## 2019-12-02 NOTE — PROGRESS NOTES
Assumed care of pt at 0715. Pt is alert and oriented. Pt states pain in his left flank. Pt medicated per MAR. Pt remained NPO this am due to scheduled surgery this afternoon.    Pt was taken to surgery around 1330 today. A left stent was placed and 2 kidney stones were removed. Pt returned to the floor around 1530. VS are stable. Pt is requesting discharge as soon as possible. MD paged. Per MD pt is to stay at least one more night to receive IV antibiotics and evaluate condition.     Pt is resting with family at bedside and denies any further needs at this time.

## 2019-12-02 NOTE — DISCHARGE INSTRUCTIONS
Discharge Instructions    Discharged to home by car with friend. Discharged via walking, hospital escort: Refused.  Special equipment needed: Not Applicable    Be sure to schedule a follow-up appointment with your primary care doctor or any specialists as instructed.     Discharge Plan:   Diet Plan: Discussed  Activity Level: Discussed  Confirmed Follow up Appointment: Patient to Call and Schedule Appointment  Confirmed Symptoms Management: Discussed  Medication Reconciliation Updated: Yes  Influenza Vaccine Indication: Not indicated: Previously immunized this influenza season and > 8 years of age(flun shot in november per pt at work)    I understand that a diet low in cholesterol, fat, and sodium is recommended for good health. Unless I have been given specific instructions below for another diet, I accept this instruction as my diet prescription.   Other diet: Regular    Special Instructions: None    · Is patient discharged on Warfarin / Coumadin?   No     Kidney Stones  Kidney stones (urolithiasis) are rock-like masses that form inside of the kidneys. Kidneys are organs that make pee (urine). A kidney stone can cause very bad pain and can block the flow of pee. The stone usually leaves your body (passes) through your pee. You may need to have a doctor take out the stone.  Follow these instructions at home:  Eating and drinking  · Drink enough fluid to keep your pee clear or pale yellow. This will help you pass the stone.  · If told by your doctor, change the foods you eat (your diet). This may include:  ¨ Limiting how much salt (sodium) you eat.  ¨ Eating more fruits and vegetables.  ¨ Limiting how much meat, poultry, fish, and eggs you eat.  · Follow instructions from your doctor about eating or drinking restrictions.  General instructions  · Collect pee samples as told by your doctor. You may need to collect a pee sample:  ¨ 24 hours after a stone comes out.  ¨ 8-12 weeks after a stone comes out, and every 6-12  months after that.  · Strain your pee every time you pee (urinate), for as long as told. Use the strainer that your doctor recommends.  · Do not throw out the stone. Keep it so that it can be tested by your doctor.  · Take over-the-counter and prescription medicines only as told by your doctor.  · Keep all follow-up visits as told by your doctor. This is important. You may need follow-up tests.  Preventing kidney stones  To prevent another kidney stone:  · Drink enough fluid to keep your pee clear or pale yellow. This is the best way to prevent kidney stones.  · Eat healthy foods.  · Avoid certain foods as told by your doctor. You may be told to eat less protein.  · Stay at a healthy weight.  Contact a doctor if:  · You have pain that gets worse or does not get better with medicine.  Get help right away if:  · You have a fever or chills.  · You get very bad pain.  · You get new pain in your belly (abdomen).  · You pass out (faint).  · You cannot pee.  This information is not intended to replace advice given to you by your health care provider. Make sure you discuss any questions you have with your health care provider.  Document Released: 06/05/2009 Document Revised: 09/05/2017 Document Reviewed: 09/05/2017  [employer] Patient Education © 2017 [employer]    Hydronephrosis  Introduction  Hydronephrosis is the enlargement of a kidney due to a blockage that stops urine from flowing out of the body.  What are the causes?  Common causes of this condition include:  · A birth (congenital) defect of the kidney.  · A congenital defect of the tube through which urine travels (ureter).  · Kidney stones.  · An enlarged prostate gland.  · A tumor.  · Cancer of the prostate, bladder, uterus, ovary, or colon.  · A blood clot.  What are the signs or symptoms?  Symptoms of this condition include:  · Pain or discomfort in your side (flank).  · Swelling of the abdomen.  · Pain in the abdomen.  · Nausea and  vomiting.  · Fever.  · Pain while passing urine.  · Feeling of urgency to urinate.  · Frequent urination.  · Infection of the urinary tract.  In some cases, there are no symptoms.  How is this diagnosed?  This condition may be diagnosed with:  · A medical history.  · A physical exam.  · Blood and urine tests to check kidney function.  · Imaging tests, such as an X-ray, ultrasound, CT scan, or MRI.  · A test in which a rigid or flexible telescope (cystoscope) is used to view the site of the blockage.  How is this treated?  Treatment for this condition depends on where the blockage is located, how long it has been there, and what caused it. The goal of treatment is to remove the blockage. Treatment options include:  · A procedure to put in a soft tube to help drain urine.  · Antibiotic medicines to treat or prevent infection.  · Shock-wave therapy (lithotripsy) to help eliminate kidney stones.  Follow these instructions at home:  · Get lots of rest.  · Drink enough fluid to keep your urine clear or pale yellow.  · If you have a drain in, follow your health care provider's instructions about how to care for it.  · Take medicines only as directed by your health care provider.  · If you were prescribed an antibiotic medicine, finish all of it even if you start to feel better.  · Keep all follow-up visits as directed by your health care provider. This is important.  Contact a health care provider if:  · You continue to have symptoms after treatment.  · You develop new symptoms.  · You have a problem with a drainage device.  · Your urine becomes cloudy or bloody.  · You have a fever.  Get help right away if:  · You have severe flank or abdominal pain.  · You develop vomiting and are unable to keep fluids down.  This information is not intended to replace advice given to you by your health care provider. Make sure you discuss any questions you have with your health care provider.  Document Released: 10/14/2008 Document  Revised: 05/25/2017 Document Reviewed: 12/14/2015  © 2017 [employer]        Depression / Suicide Risk    As you are discharged from this [facility] facility, it is important to learn how to keep safe from harming yourself.    Recognize the warning signs:  · Abrupt changes in personality, positive or negative- including increase in energy   · Giving away possessions  · Change in eating patterns- significant weight changes-  positive or negative  · Change in sleeping patterns- unable to sleep or sleeping all the time   · Unwillingness or inability to communicate  · Depression  · Unusual sadness, discouragement and loneliness  · Talk of wanting to die  · Neglect of personal appearance   · Rebelliousness- reckless behavior  · Withdrawal from people/activities they love  · Confusion- inability to concentrate     If you or a loved one observes any of these behaviors or has concerns about self-harm, here's what you can do:  · Talk about it- your feelings and reasons for harming yourself  · Remove any means that you might use to hurt yourself (examples: pills, rope, extension cords, firearm)  · Get professional help from the community (Mental Health, Substance Abuse, psychological counseling)  · Do not be alone:Call your Safe Contact- someone whom you trust who will be there for you.  · Call your local CRISIS HOTLINE 443-7278 or 339-763-5306  · Call your local Children's Mobile Crisis Response Team [place] (445) 778-5373 or www.[employer]  · Call the toll free National Suicide Prevention Hotlines   · National Suicide Prevention Lifeline 733-603-JLUV (4353)  · National Hope Line Network 800-SUICIDE (416-4957)

## 2019-12-02 NOTE — PROGRESS NOTES
Discharge instructions reviewed and signed by patient. No further questions at this time. IV DC'd by RN. All belongings gathered and given to patient. Patient left via car with friend.

## 2019-12-02 NOTE — DISCHARGE SUMMARY
Discharge Summary    CHIEF COMPLAINT ON ADMISSION  Left flank pain    Reason for Admission  Lt hydronephrosis     Admission Date  11/30/2019    CODE STATUS  Full Code    HPI & HOSPITAL COURSE  This is a 49 y.o. male here with uncontrolled left flank pain.  12/1:  This 49-year-old male with a previously diagnosed 1125 1910 mm left UPJ stone who had cystoscopy with Dr. Lynda ANDERSON and stent placement.  Dr. Bobby Ball was unable to perform lithotripsy on the large stone due to a narrow ureter.  The patient at home continue to have worsening left flank pain to the point that he presented to the emergency room.  CT again shows large 10 mm left UPJ stone.  Urology Dr. Logan was consulted.  IR was consulted however no significant hydronephrosis noted therefore no external stent placed.  Urine culture negative from 11/27/2019.  Normal renal function.  Patient is having normal urine output.  N.p.o. today for cystoscopy and lithotripsy by Dr. Logan.*       Patient doing well post lithotripsy of left 10mm UPJ stone.  He has a ureteral stent that remains and will need close follow up with Dr. Whitehead.  UC from 11/27/19 negative.  Renal function wnl.    Therefore, he is discharged in good and stable condition to home with close outpatient follow-up.    The patient met 2-midnight criteria for an inpatient stay at the time of discharge.    Discharge Date  12/2/19    FOLLOW UP ITEMS POST DISCHARGE  Follow up with Dr. Whitehead for management of left ureteral stent from 10mm left UPJ stone lithotripsy.    DISCHARGE DIAGNOSES  Active Problems:    Left ureteral stone POA: Yes    Hyperglycemia POA: Yes    Encounter for adjustment of ureteral stent POA: Yes    Status post laser lithotripsy of ureteral calculus POA: Yes  Resolved Problems:    * No resolved hospital problems. *      FOLLOW UP  No future appointments.  No follow-up provider specified.    MEDICATIONS ON DISCHARGE     Medication List      START taking these medications       Instructions   Probiotic 250 MG Caps   Take 1 Cap by mouth every day with lunch.  Dose:  1 Cap     senna-docusate 8.6-50 MG Tabs  Commonly known as:  PERICOLACE or SENOKOT S   Take 2 Tabs by mouth every day.  Dose:  2 Tab        CHANGE how you take these medications      Instructions   oxyCODONE-acetaminophen 5-325 MG Tabs  What changed:    · when to take this  · reasons to take this  Commonly known as:  PERCOCET   Take 1-2 Tabs by mouth every 6 hours as needed for Severe Pain for up to 5 days.  Dose:  1-2 Tab        CONTINUE taking these medications      Instructions   acetaminophen 500 MG Tabs  Commonly known as:  TYLENOL   Take 500-1,000 mg by mouth every 6 hours as needed.  Dose:  500-1,000 mg            Allergies  No Known Allergies    DIET  Orders Placed This Encounter   Procedures   • Diet Order Regular     Standing Status:   Standing     Number of Occurrences:   1     Order Specific Question:   Diet:     Answer:   Regular [1]       ACTIVITY  As tolerated.  Weight bearing as tolerated    CONSULTATIONS  Dr. Logan    PROCEDURES  Immediate Post OP Note     PreOp Diagnosis: 10 mm left UPJ stone     PostOp Diagnosis: same as pre-op dx     Procedure(s):  CYSTOSCOPY, WITH URETERAL STENT INSERTION - Wound Class: Clean Contaminated  LITHOTRIPSY, USING LASER - Wound Class: Clean Contaminated     Surgeon(s):  Nikos Logan M.D.     Anesthesiologist/Type of Anesthesia:  Anesthesiologist: Hermes Boyd M.D./General     Surgical Staff:  Circulator: Sandra Hernández R.N.; Rubina Garcia RMauryNMaury  Relief Circulator: Sherlyn Ramos R.N.  Relief Scrub: Kvng Rodarte  Scrub Person: Susanna Montoya     Specimens removed if any:  ID Type Source Tests Collected by Time Destination   A :  Stone Ureter PATHOLOGY SPECIMEN Nikos Logan M.D. 12/1/2019 1310           Estimated Blood Loss: 0     Findings: 100 left UPJ stone and left ureteral stent     Complications: none           12/1/2019 1:57 PM Nikos Logan  M.D.      LABORATORY  Lab Results   Component Value Date    SODIUM 138 12/02/2019    POTASSIUM 4.0 12/02/2019    CHLORIDE 108 12/02/2019    CO2 22 12/02/2019    GLUCOSE 117 (H) 12/02/2019    BUN 18 12/02/2019    CREATININE 0.94 12/02/2019        Lab Results   Component Value Date    WBC 10.3 12/02/2019    HEMOGLOBIN 13.6 (L) 12/02/2019    HEMATOCRIT 40.7 (L) 12/02/2019    PLATELETCT 272 12/02/2019        Total time of the discharge process exceeds 45 minutes.

## 2019-12-02 NOTE — PROGRESS NOTES
Pt AOx4, denies pain at this time. Pt fatigued, resting quietly in bed, needs met. Call light within reach, personal belongings available, bed in lowest position, treaded socks on, and hourly rounding in place.

## 2019-12-02 NOTE — PROGRESS NOTES
Note to reader: this note follows the APSO format rather than the historical SOAP format. Assessment and plan located at the top of the note for ease of use.    Chief Complaint  49 y.o. year old male here with uncontrolled L flank pain. Now s/p L CULTS for 10mm UPJ stone on 12/1/19.     Assessment/Plan  Interval History   Active Hospital Problems    Diagnosis   • Encounter for adjustment of ureteral stent [Z46.6]   • Status post laser lithotripsy of ureteral calculus [Z98.890]   • Left ureteral stone [N20.1]   • Hyperglycemia [R73.9]     50 yo M with 10mm L UPJ stone now s/p L CULTS with ureteral stent exchange on 12/1/19.    Plan:  - Continue abx as directed by hospitalist  - Continue to monitor pain  - Ok to discharge home from urology standpoint, no acute intervention anticipated at this time.   - Pt to have outpatient follow up for stent removal, our office will contact them.   - Urology signing off  50 yo M with 10mm L UPJ stone now s/p L CULTS with ureteral stent exchange on 12/1/19.     Previously s/p stent placement with Dr. Dannie Ball on 11/25/19. Stone not able to be treated at that time d/t tight ureteral orifice. Re-presented on 12/1 for uncontrolled flank pain on 12/1.      patient seen and examined.     12/2. Pt doing well. Denies any uncontrolled pain, and only complains of mild stent irritation. Eating well, normal BMs, ambulating without difficulty. Cr 0.94, WBC 10.3.                Review of Systems  Physical Exam   Review of Systems   Constitutional: Negative for chills and fever.   Cardiovascular: Negative for chest pain.   Gastrointestinal: Negative for nausea and vomiting.   Genitourinary: Positive for dysuria. Negative for flank pain, frequency, hematuria and urgency.     Vitals:    12/01/19 1650 12/01/19 1730 12/01/19 1938 12/02/19 0321   BP: 131/86 136/87 130/74 121/72   Pulse: 76 75 88 81   Resp: 16 16 16 16   Temp: 36.7 °C (98 °F) 37.2 °C (99 °F) 37.1 °C (98.7 °F) 37.5 °C (99.5 °F)    TempSrc: Temporal Temporal Temporal Temporal   SpO2: 94% 90% 91% 94%     Physical Exam   Constitutional: He is oriented to person, place, and time and well-developed, well-nourished, and in no distress.   HENT:   Head: Normocephalic and atraumatic.   Neck: Normal range of motion. Neck supple.   Pulmonary/Chest: Effort normal.   Abdominal: Soft.   Neurological: He is alert and oriented to person, place, and time.   Skin: Skin is warm and dry.   Psychiatric: Mood, memory, affect and judgment normal.        Hematology Chemistry   Lab Results   Component Value Date/Time    WBC 10.3 12/02/2019 03:27 AM    HEMOGLOBIN 13.6 (L) 12/02/2019 03:27 AM    HEMATOCRIT 40.7 (L) 12/02/2019 03:27 AM    PLATELETCT 272 12/02/2019 03:27 AM     Lab Results   Component Value Date/Time    SODIUM 138 12/02/2019 03:27 AM    POTASSIUM 4.0 12/02/2019 03:27 AM    CHLORIDE 108 12/02/2019 03:27 AM    CO2 22 12/02/2019 03:27 AM    GLUCOSE 117 (H) 12/02/2019 03:27 AM    BUN 18 12/02/2019 03:27 AM    CREATININE 0.94 12/02/2019 03:27 AM         Labs not explicitly included in this progress note were reviewed by the author.   Radiology/imaging not explicitly included in this progress note was reviewed by the author.     Core Measures

## 2019-12-02 NOTE — DIETARY
NUTRITION SERVICES: BMI - Pt with BMI >40 (=41.88), morbid obesity. Weight loss counseling not appropriate in acute care setting. RECOMMEND - Referral to outpatient nutrition services for weight management after D/C.

## 2019-12-02 NOTE — CARE PLAN
Problem: Infection  Goal: Will remain free from infection  Outcome: PROGRESSING AS EXPECTED  Intervention: Assess signs and symptoms of infection  Note:   Assessed for signs/symptoms of infection and administered IV antibiotics as ordered per MAR.      Problem: Pain Management  Goal: Pain level will decrease to patient's comfort goal  Outcome: PROGRESSING AS EXPECTED  Intervention: Follow pain managment plan developed in collaboration with patient and Interdisciplinary Team  Note:   Administered pain medication per MAR to maintain pt's comfort level.

## 2019-12-03 LAB
BACTERIA UR CULT: NORMAL
SIGNIFICANT IND 70042: NORMAL
SITE SITE: NORMAL
SOURCE SOURCE: NORMAL

## 2019-12-05 LAB
APPEARANCE STONE: NORMAL
COMPN STONE: NORMAL
NUMBER STONE: 1
SIZE STONE: NORMAL MM
SPECIMEN WT: 14 MG

## 2023-03-15 NOTE — ASSESSMENT & PLAN NOTE
Patient had a ureteral stone placed 11/25 with Dr. Whitehead and was noted to have a narrow left ureter and lithotripsy was not able to be done  The patient was discussed with Dr. Logan who recommends nephrostomy tube placement, I discussed with Dr. Rocha from radiology  I will order pain medication as needed for discomfort   MED

## 2025-03-24 NOTE — CARE PLAN
Date here    Dear (patient name)    You are scheduled for a: Colonoscopy     Please read these instructions very carefully at least 10 days prior to your procedure.     Prior to the procedure, the procedure center will be calling to go over your health history and medications to be taken on the day of the procedure.     If there are questions about these instructions or any changes in your health history or medications, please call the office at [phone] at least 7-10 days prior to your procedure.     If you need to cancel or reschedule, please give the office a call within 3 business days prior to the procedure.     Procedure Date:   Arrival Time:   Procedure Time:   Procedure Location:       Other Instructions:       COLONOSCOPY PREP - MIRALAX, GATORADE, DULCOLAX    WHAT IS A COLONOSCOPY  A colonoscopy is a test to view the inside of the lower digestive tract. Colonoscopy can help find precancerous polyps and colorectal cancer at an early stage, when treatment is most effective. Research shows that colonoscopies may reduce new cases of colorectal cancer by up to 69%.    You will need to follow instructions for a successful prep  10 DAYS   BEFORE PROCEDURE    238 g of Miralax, 64 oz of Gatorade (no red or purple) & 20 mg of Dulcolax laxative over the counter at preferred pharmacy    7 DAYS   BEFORE PROCEDURE   If applicable, discontinue use of GLP-1 medications (i.e. Ozempic, Wegovy, Trulicity, Victoza, Mounjaro)    5 DAYS   BEFORE PROCEDURE  Stop taking iron supplements   Avoid anti-inflammatory drugs unless necessary (Advil, Motrin, Ibuprofen, etc.)    3 DAYS   BEFORE PROCEDURE  Start following Low Fiber Diet until 1 day prior to procedure (diet included on next page)   Do not eat nuts, seeds, corn, popcorn, and fiber supplements until after your procedure    2 DAYS   BEFORE PROCEDURE  Confirm driving arrangements; please plan to have a responsible adult accompany you to the procedure and for 12    Problem: Safety  Goal: Will remain free from falls  Outcome: PROGRESSING AS EXPECTED     Problem: Infection  Goal: Will remain free from infection  Outcome: PROGRESSING AS EXPECTED      hours after the procedure. We cannot release patient post-procedure back home via Uber/taxi   No solid foods after midnight          1 DAY   BEFORE PROCEDURE  Start following Clear Liquid Diet included in this document   Drink 8-10 glasses of clear liquids to avoid dehydration   Before 9 AM: In a large pitcher mix 238 g of Miralax with 64 oz of Gatorade and refrigerate   Between 2-4 PM: take 20 mg Dulcolax   2 hours after taking Dulcolax: start drinking the Miralax solution. Drink 1 glass (8oz) every 15-30 minutes over a 2-4 hour period until all the solution is gone.       DAY OF   THE PROCEDURE  Continue following Clear Liquid Diet until 4 hours prior to arrival time   4 hours before appointment:   No more liquids or food   Can brush teeth, but do not swallow    **Prepping times and instructions can be altered depending on the time of your procedure.   **Please call our office and ask to speak to a nurse to discuss, or call your procedural site's phone number.    LOW FIBER DIET  Foods Allowed Not Allowed     Drinks Coffee, tea, hot chocolate, soda, fruit & vegetable juice with no pulp; less than 2 cups milk/day No alcohol, no pulp in fruit or vegetable juice, no red or purple color drinks     Breads & Cereal White bread, bagels; cooked cereals including  corn flakes, crisp/puffed rice, Cream of Wheat No whole wheat/whole grain bread, rolls, crackers or cereal, no breads/cereal with bran, oats, seeds, nuts, raisins or dates, no oatmeal   Milk Products Cheese, plain yogurt, sour cream, buttermilk, cream No milk products with granola or berries (or other fruit with skin)   Meats & Eggs Chicken, turkey, fish, seafood, tofu, eggs No processed or fried meats     Fats Butter, cream, mariee, avocado, cooking oils & shortening, creamy nut butter No coconut, spicy salad dressings, chunky nut butters     Fruits Ripe, peeled apples, bananas, melon, pears & peaches, cooked/ canned fruits without skin/peel/ membranes No raw fruit  with seeds, skin, or membranes: berries, pineapple, apples, oranges, grapefruit, watermelon, kiwi, pomegranate, dragon fruit   Vegetables Well cooked only: asparagus, carrots, mushrooms, pumpkin No raw allowed; no red or pugh beets, raw spinach, lettuces, cucumber, peas, beans   Potatoes & Starches Sweet, white & yellow potatoes without skin, noodles, white rice No purple potatoes, fried potatoes, potato skins, whole wheat pasta, brown & wild rice   Beans None allowed None allowed   Nuts & Seeds None allowed None allowed     Soups Cream soups made with allowed milk (less than 2 cups/day) & allowed vegetables, broth soups Soups with more than 2 cups of milk per serving per day   Other Cooking spices not mentioned to the right (in moderation) No garlic, toan, olives, pickles, popcorn, horseradish, cayenne, chili powder     CLEAR LIQUID DIET  Clear liquids only, no solid foods     No chewing tobacco  Make sure to drink 12 cups of clear liquid, aiming for the right calorie intake            Allowed:   Ice pop/popsicles - no red or purple  Clear broth (all flavors)  Coffee & tea - no milk or creamer; sugar/sweetener OK  Gummy bears - No red or purple  Fruit juice (no pulp) - No red or purple  Sports drinks - no red or purple  Water/Ice - No red or purple  Soda & carbonated beverages - No red or purple  Gelatin/Jell-O® - No red or purple      If you are diabetic, aim for 45 grams of carbs per meal and 15-30 grams per snack.    Clear Liquids with about 15 grams of carbohydrates - 4oz apple juice, 8oz sports drink, 1/2 cup gelatin, 2 popsicles/ice pops    Clear liquids with no carbohydrates - Black coffee, tea (unsweetened or diet), clear diet soda, seltzer, flavored water, fat-free broth, bouillon or consommé          Sincerely,   MD's name  [facility]  [id number] [id number]  Dept: [phone]  Dept Fax: [phone]

## (undated) DEVICE — MASK ANESTHESIA ADULT  - (100/CA)

## (undated) DEVICE — TUBE CONNECT SUCTION CLEAR 120 X 1/4" (50EA/CA)"

## (undated) DEVICE — ELECTRODE 850 FOAM ADHESIVE - HYDROGEL RADIOTRNSPRNT (50/PK)

## (undated) DEVICE — CONTAINER SPECIMEN BAG OR - STERILE 4 OZ W/LID (100EA/CA)

## (undated) DEVICE — GOWN SURGICAL X-LARGE ULTRA - FILM-REINFORCED (20/CA)

## (undated) DEVICE — SPONGE GAUZESTER 4 X 4 4PLY - (128PK/CA)

## (undated) DEVICE — SLEEVE, VASO, THIGH, MED

## (undated) DEVICE — GLOVE BIOGEL SZ 7 SURGICAL PF LTX - (50PR/BX 4BX/CA)

## (undated) DEVICE — COVER FOOT UNIVERSAL DISP. - (25EA/CA)

## (undated) DEVICE — GOWN SURGEONS X-LARGE - DISP. (30/CA)

## (undated) DEVICE — PROTECTOR ULNA NERVE - (36PR/CA)

## (undated) DEVICE — GOWN WARMING STANDARD FLEX - (30/CA)

## (undated) DEVICE — SCOPE DIGITAL URETEROSCOPE DISPOSABLE

## (undated) DEVICE — HEAD HOLDER JUNIOR/ADULT

## (undated) DEVICE — LACTATED RINGERS INJ 1000 ML - (14EA/CA 60CA/PF)

## (undated) DEVICE — NEPTUNE 4 PORT MANIFOLD - (20/PK)

## (undated) DEVICE — WATER IRRIG. STER. 1500 ML - (9/CA)

## (undated) DEVICE — JELLY SURGILUBE STERILE TUBE 4.25 OZ (1/EA)

## (undated) DEVICE — LASER TRAC TIP 200 MIRCON FOR 100 WATT LASER

## (undated) DEVICE — SET IRRIGATION CYSTOSCOPY Y-TYPE L81 IN (20EA/CA)

## (undated) DEVICE — SENSOR SPO2 NEO LNCS ADHESIVE (20/BX) SEE USER NOTES

## (undated) DEVICE — BAG URODRAIN WITH TUBING - (20/CA)

## (undated) DEVICE — BASKET ZERO TIP

## (undated) DEVICE — CONNECTOR HOSE NEPTUNE FOR CYSTO ROOM

## (undated) DEVICE — CATHETER URET OPEN END 6FR (10EA/BX)

## (undated) DEVICE — TUBING CLEARLINK DUO-VENT - C-FLO (48EA/CA)

## (undated) DEVICE — KIT ROOM DECONTAMINATION

## (undated) DEVICE — SODIUM CHL. IRRIGATION 0.9% 3000ML (4EA/CA 65CA/PF)

## (undated) DEVICE — PACK CYSTOSCOPY - (14/CA)

## (undated) DEVICE — GLOVE BIOGEL SZ 7.5 SURGICAL PF LTX - (50PR/BX 4BX/CA)

## (undated) DEVICE — TOWELS CLOTH SURGICAL - (4/PK 20PK/CA)

## (undated) DEVICE — SET EXTENSION WITH 2 PORTS (48EA/CA) ***PART #2C8610 IS A SUBSTITUTE*****

## (undated) DEVICE — GLOVE BIOGEL PI INDICATOR SZ 7.5 SURGICAL PF LF -(50/BX 4BX/CA)

## (undated) DEVICE — KIT ANESTHESIA W/CIRCUIT & 3/LT BAG W/FILTER (20EA/CA)

## (undated) DEVICE — SYRINGE DISP. 12 CC LL - (100/BX)

## (undated) DEVICE — SUCTION INSTRUMENT YANKAUER BULBOUS TIP W/O VENT (50EA/CA)

## (undated) DEVICE — SET LEADWIRE 5 LEAD BEDSIDE DISPOSABLE ECG (1SET OF 5/EA)

## (undated) DEVICE — MEDICINE CUP STERILE 2 OZ - (100/CA)

## (undated) DEVICE — WATER IRRIG. STER 3000 ML - (4/CA)

## (undated) DEVICE — WIRE GUIDE SENSOR DUAL FLEX - 5/BX